# Patient Record
Sex: FEMALE | Race: BLACK OR AFRICAN AMERICAN | Employment: UNEMPLOYED | URBAN - METROPOLITAN AREA
[De-identification: names, ages, dates, MRNs, and addresses within clinical notes are randomized per-mention and may not be internally consistent; named-entity substitution may affect disease eponyms.]

---

## 2017-01-31 ENCOUNTER — OFFICE VISIT (OUTPATIENT)
Dept: ORTHOPEDIC SURGERY | Age: 62
End: 2017-01-31

## 2017-01-31 VITALS
DIASTOLIC BLOOD PRESSURE: 76 MMHG | WEIGHT: 185 LBS | RESPIRATION RATE: 18 BRPM | OXYGEN SATURATION: 97 % | BODY MASS INDEX: 29.73 KG/M2 | SYSTOLIC BLOOD PRESSURE: 146 MMHG | TEMPERATURE: 98 F | HEART RATE: 74 BPM | HEIGHT: 66 IN

## 2017-01-31 DIAGNOSIS — M48.061 LUMBAR SPINAL STENOSIS: Primary | ICD-10-CM

## 2017-01-31 DIAGNOSIS — M25.551 RIGHT HIP PAIN: ICD-10-CM

## 2017-01-31 DIAGNOSIS — M43.10 DEGENERATIVE SPONDYLOLISTHESIS: ICD-10-CM

## 2017-01-31 DIAGNOSIS — M79.2 NEUROPATHIC PAIN: ICD-10-CM

## 2017-01-31 RX ORDER — ALBUTEROL SULFATE 90 UG/1
2 AEROSOL, METERED RESPIRATORY (INHALATION)
COMMUNITY
Start: 2017-01-29 | End: 2017-09-25

## 2017-01-31 RX ORDER — AMLODIPINE BESYLATE 10 MG/1
10 TABLET ORAL DAILY
Refills: 0 | COMMUNITY
Start: 2016-12-28

## 2017-01-31 RX ORDER — NAPROXEN 500 MG/1
500 TABLET ORAL
COMMUNITY
Start: 2016-05-02 | End: 2017-09-25

## 2017-01-31 RX ORDER — OXYCODONE AND ACETAMINOPHEN 5; 325 MG/1; MG/1
TABLET ORAL
COMMUNITY
Start: 2016-07-10 | End: 2017-09-25

## 2017-01-31 RX ORDER — DICLOFENAC SODIUM 75 MG/1
TABLET, DELAYED RELEASE ORAL
Refills: 0 | COMMUNITY
Start: 2016-10-25 | End: 2017-09-25

## 2017-01-31 RX ORDER — FERROUS SULFATE, DRIED 160(50) MG
TABLET, EXTENDED RELEASE ORAL
COMMUNITY
Start: 2015-04-14 | End: 2017-09-25

## 2017-01-31 RX ORDER — DOXYCYCLINE 100 MG/1
100 CAPSULE ORAL
COMMUNITY
End: 2017-09-25

## 2017-01-31 RX ORDER — GABAPENTIN 100 MG/1
100 CAPSULE ORAL
COMMUNITY
Start: 2016-08-02 | End: 2017-09-25

## 2017-01-31 RX ORDER — BENZONATATE 100 MG/1
CAPSULE ORAL
COMMUNITY
Start: 2016-01-27 | End: 2017-09-25

## 2017-01-31 RX ORDER — HYDROCHLOROTHIAZIDE 12.5 MG/1
25 CAPSULE ORAL
COMMUNITY
Start: 2015-10-01

## 2017-01-31 RX ORDER — CETIRIZINE HCL 10 MG
10 TABLET ORAL
COMMUNITY
Start: 2017-01-29 | End: 2017-02-11

## 2017-01-31 RX ORDER — DIAZEPAM 5 MG/1
5 TABLET ORAL
COMMUNITY
Start: 2016-03-26 | End: 2017-09-25

## 2017-01-31 RX ORDER — LISINOPRIL 20 MG/1
20 TABLET ORAL
COMMUNITY
Start: 2015-10-01 | End: 2017-09-25

## 2017-01-31 RX ORDER — ALBUTEROL SULFATE 90 UG/1
2 AEROSOL, METERED RESPIRATORY (INHALATION)
COMMUNITY
Start: 2016-07-10 | End: 2017-09-25

## 2017-01-31 RX ORDER — NEOMYCIN SULFATE, POLYMYXIN B SULFATE AND HYDROCORTISONE 10; 3.5; 1 MG/ML; MG/ML; [USP'U]/ML
SUSPENSION/ DROPS AURICULAR (OTIC)
COMMUNITY
Start: 2017-01-29 | End: 2017-09-25

## 2017-01-31 RX ORDER — POLYETHYLENE GLYCOL 3350 17 G/17G
17 POWDER, FOR SOLUTION ORAL
COMMUNITY
Start: 2016-07-10 | End: 2017-09-25

## 2017-01-31 NOTE — PATIENT INSTRUCTIONS
Lumbar Stenosis: Care Instructions  Your Care Instructions    Stenosis in the spine is a narrowing of the canal that is around the spinal cord and nerve roots in your back. It can happen as part of aging. Sometimes bone and other tissue grow into this canal and press on the spinal nerves. This can cause pain, numbness, and weakness. When it happens in the lower part of your back, it is called lumbar stenosis. Lumbar stenosis can cause problems in the legs, feet, and rear end (buttocks). You may be able to relieve symptoms of lumbar stenosis with pain medicine. Your doctor may suggest physical therapy and exercises to keep your spine strong and flexible. Some people try cortisone shots to reduce swelling. If pain and numbness in your legs are still so bad that you cannot do your normal activities, you may need surgery. Follow-up care is a key part of your treatment and safety. Be sure to make and go to all appointments, and call your doctor if you are having problems. It's also a good idea to know your test results and keep a list of the medicines you take. How can you care for yourself at home? · Take an over-the-counter pain medicine, such as acetaminophen (Tylenol), ibuprofen (Advil, Motrin), or naproxen (Aleve). Read and follow all instructions on the label. · Do not take two or more pain medicines at the same time unless the doctor told you to. Many pain medicines have acetaminophen, which is Tylenol. Too much acetaminophen (Tylenol) can be harmful. · Stay at a healthy weight. Being overweight puts extra strain on your spine. · Change positions often when you sit or stand. This can ease pain. For example, lean forward. This may reduce pressure on the spinal cord and its nerves. · Avoid doing things that make your symptoms worse. Walking downhill and standing for a long time may cause pain. · Stretch and strengthen your back muscles as your doctor or physical therapist recommends.  If your doctor says it is okay to do them, these exercises may help. ¨ Lie on your back with your knees bent. Gently pull one bent knee to your chest. Put that foot back on the floor, and then pull the other knee to your chest.  ¨ Do pelvic tilts. Lie on your back with your knees bent. Tighten your stomach muscles. Pull your belly button (navel) in and up toward your ribs. You should feel like your back is pressing to the floor and your hips and pelvis are slightly lifting off the floor. Hold for 6 seconds while breathing smoothly. ¨ Stand with your back flat against a wall. Slowly slide down until your knees are slightly bent. Hold for 10 seconds, then slide back up the wall. · Remove or change anything in your house that may cause you to fall. Keep walkways clear of clutter, electrical cords, and throw rugs. When should you call for help? Call 911 anytime you think you may need emergency care. For example, call if:  · You are unable to move a leg at all. Call your doctor now or seek immediate medical care if:  · You have new or worse symptoms in your legs, belly, or buttocks. Symptoms may include:  ¨ Numbness or tingling. ¨ Weakness. ¨ Pain. · You lose bladder or bowel control. Watch closely for changes in your health, and be sure to contact your doctor if:  · You are not getting better as expected. Where can you learn more? Go to http://mouna-noris.info/. Lobo Francis in the search box to learn more about \"Lumbar Stenosis: Care Instructions. \"  Current as of: May 23, 2016  Content Version: 11.1  © 6647-7329 [employer]. Care instructions adapted under license by [employer] (which disclaims liability or warranty for this information). If you have questions about a medical condition or this instruction, always ask your healthcare professional. [address] any warranty or liability for your use of this information.

## 2017-01-31 NOTE — MR AVS SNAPSHOT
Visit Information Date & Time Provider Department Dept. Phone Encounter #  
 1/31/2017  1:00 PM Gurwinder Pfeiffer  [facility], [address] [facility] [phone] [id number] Follow-up Instructions Follow-up and Disposition History Your Appointments 3/1/2017  1:00 PM  
EMG with Agustina Benjamin MD  
[facility] [address] [facility]) Appt Note: Dr. Ed Kennedy; right lower extremity; order in cc; new pt pkt mld. ...ywp  
 [address] [address] [phone]  
  
   
 [facility] [address] Upcoming Health Maintenance Date Due Hepatitis C Screening 1955 BREAST CANCER SCRN MAMMOGRAM 5/6/2006 FOBT Q 1 YEAR AGE 50-75 5/6/2006 PAP AKA CERVICAL CYTOLOGY 2/14/2015 ZOSTER VACCINE AGE 60> 5/6/2016 INFLUENZA AGE 9 TO ADULT 8/1/2016 DTaP/Tdap/Td series (2 - Td) 10/31/2022 Allergies as of 1/31/2017  Review Complete On: 1/31/2017 By: Gurwinder Pfeiffer MD  
 No Known Allergies Current Immunizations  Never Reviewed Name Date TDAP Vaccine 10/31/2012 10:36 AM  
  
 Not reviewed this visit You Were Diagnosed With   
  
 Codes Comments Lumbar spinal stenosis    -  Primary ICD-10-CM: M48.06 
ICD-9-CM: 724.02 Right hip pain     ICD-10-CM: M25.551 ICD-9-CM: 719.45 Neuropathic pain     ICD-10-CM: M79.2 ICD-9-CM: 729.2 Degenerative spondylolisthesis     ICD-10-CM: M43.10 ICD-9-CM: 738.4 Vitals BP Pulse Temp Resp Height(growth percentile) Weight(growth percentile) 146/76 74 98 °F (36.7 °C) (Oral) 18 5' 6\" (1.676 m) 185 lb (83.9 kg) SpO2 BMI OB Status Smoking Status 97% 29.86 kg/m2 Postmenopausal Never Smoker BMI and BSA Data Body Mass Index Body Surface Area  
 29.86 kg/m 2 1.98 m 2 Preferred Pharmacy Pharmacy Name Phone [address] [address] [phone] Your Updated Medication List  
  
   
This list is accurate as of: 1/31/17  2:10 PM.  Always use your most recent med list.  
  
  
  
  
 * albuterol 90 mcg/actuation inhaler Commonly known as:  PROVENTIL HFA, VENTOLIN HFA, PROAIR HFA  
2 Puffs. * PROAIR HFA 90 mcg/actuation inhaler Generic drug:  albuterol 2 Puffs. amitriptyline 25 mg tablet Commonly known as:  ELAVIL  
take 1 tablet by mouth at bedtime * amLODIPine 5 mg tablet Commonly known as:  Tad Agatha 5 mg. * amLODIPine 10 mg tablet Commonly known as:  NORVASC  
  
 calcium-vitamin D 500 mg(1,250mg) -200 unit per tablet Commonly known as:  OYSTER SHELL Take 1 Tab by Mouth 3 Times Daily. Please take calcium per the following instructions: dispense 84 tabs2 tablets (1g) three times daily for 1 week, 2 tablets (1g) twice daily for 1 week,2 tablets (1g) once daily for 1 week. cyclobenzaprine 10 mg tablet Commonly known as:  FLEXERIL Take 1 Tab by mouth three (3) times daily as needed for Muscle Spasm(s). diclofenac EC 75 mg EC tablet Commonly known as:  VOLTAREN  
take 1 tablet once daily if needed  
  
 doxycycline 100 mg capsule Commonly known as:  MONODOX  
100 mg.  
  
 hydroCHLOROthiazide 12.5 mg capsule Commonly known as:  MICROZIDE  
25 mg.  
  
 ibuprofen 600 mg tablet Commonly known as:  MOTRIN Take 1 Tab by mouth every six (6) hours as needed for Pain.  
  
 levothyroxine 100 mcg tablet Commonly known as:  SYNTHROID Take 100 mcg by mouth Daily (before breakfast). lisinopril 20 mg tablet Commonly known as:  PRINIVIL, ZESTRIL  
20 mg. NAPROSYN 500 mg tablet Generic drug:  naproxen 500 mg.  
  
 neomycin-polymyxin-hydrocortisone (buffered) 3.5-10,000-1 mg/mL-unit/mL-% otic suspension Commonly known as:  Lopeno Low Instill 4 Drops in both ears 4 Times Daily. NEURONTIN 100 mg capsule Generic drug:  gabapentin 100 mg. NORVIR 100 mg tablet Generic drug:  ritonavir Norvir 100 MG Oral Tablet TAKE 1 TABLET DAILY  Quantity: 30;  Refills: 5    Helane Eis P.A.;  Started 25-Aug-2010 Active PERCOCET 5-325 mg per tablet Generic drug:  oxyCODONE-acetaminophen Take 1 Tab by Mouth Every 6 Hours As Needed. Do not exceed 4000 mg of acetaminophen per day. polyethylene glycol 17 gram/dose powder Commonly known as:  MIRALAX  
17 g. REYATAZ 300 mg capsule Generic drug:  atazanavir Take 300 mg by mouth daily. TESSALON PERLES 100 mg capsule Generic drug:  benzonatate Take 1 capsule 3 times daily as needed for coughing, swallow capsules whole. TRUVADA 200-300 mg per tablet Generic drug:  emtricitabine-tenofovir (TDF) Take 1 Tab by mouth daily. VALIUM 5 mg tablet Generic drug:  diazePAM  
5 mg. Carrol Yoon Rx for outpatient rollator for lumbar radiculopathy with leg weakness. High fall risk. ZyrTEC 10 mg tablet Generic drug:  cetirizine 10 mg.  
  
 * Notice: This list has 4 medication(s) that are the same as other medications prescribed for you. Read the directions carefully, and ask your doctor or other care provider to review them with you. To-Do List   
 01/31/2017 Neurology:  EMG ONE EXTREMITY LOWER RT   
  
 01/31/2017 Imaging:  MRI HIP  RT  WO CONT Patient Instructions Lumbar Stenosis: Care Instructions Your Care Instructions Stenosis in the spine is a narrowing of the canal that is around the spinal cord and nerve roots in your back. It can happen as part of aging. Sometimes bone and other tissue grow into this canal and press on the spinal nerves. This can cause pain, numbness, and weakness. When it happens in the lower part of your back, it is called lumbar stenosis. Lumbar stenosis can cause problems in the legs, feet, and rear end (buttocks). You may be able to relieve symptoms of lumbar stenosis with pain medicine. Your doctor may suggest physical therapy and exercises to keep your spine strong and flexible. Some people try cortisone shots to reduce swelling. If pain and numbness in your legs are still so bad that you cannot do your normal activities, you may need surgery. Follow-up care is a key part of your treatment and safety. Be sure to make and go to all appointments, and call your doctor if you are having problems. It's also a good idea to know your test results and keep a list of the medicines you take. How can you care for yourself at home? · Take an over-the-counter pain medicine, such as acetaminophen (Tylenol), ibuprofen (Advil, Motrin), or naproxen (Aleve). Read and follow all instructions on the label. · Do not take two or more pain medicines at the same time unless the doctor told you to. Many pain medicines have acetaminophen, which is Tylenol. Too much acetaminophen (Tylenol) can be harmful. · Stay at a healthy weight. Being overweight puts extra strain on your spine. · Change positions often when you sit or stand. This can ease pain. For example, lean forward. This may reduce pressure on the spinal cord and its nerves. · Avoid doing things that make your symptoms worse. Walking downhill and standing for a long time may cause pain. · Stretch and strengthen your back muscles as your doctor or physical therapist recommends. If your doctor says it is okay to do them, these exercises may help. ¨ Lie on your back with your knees bent. Gently pull one bent knee to your chest. Put that foot back on the floor, and then pull the other knee to your chest. 
¨ Do pelvic tilts. Lie on your back with your knees bent. Tighten your stomach muscles. Pull your belly button (navel) in and up toward your ribs. You should feel like your back is pressing to the floor and your hips and pelvis are slightly lifting off the floor. Hold for 6 seconds while breathing smoothly. ¨ Stand with your back flat against a wall. Slowly slide down until your knees are slightly bent. Hold for 10 seconds, then slide back up the wall. · Remove or change anything in your house that may cause you to fall. Keep walkways clear of clutter, electrical cords, and throw rugs. When should you call for help? Call 911 anytime you think you may need emergency care. For example, call if: 
· You are unable to move a leg at all. Call your doctor now or seek immediate medical care if: 
· You have new or worse symptoms in your legs, belly, or buttocks. Symptoms may include: ¨ Numbness or tingling. ¨ Weakness. ¨ Pain. · You lose bladder or bowel control. Watch closely for changes in your health, and be sure to contact your doctor if: 
· You are not getting better as expected. Where can you learn more? Go to http://mouna-noris.info/. Adan Winters in the search box to learn more about \"Lumbar Stenosis: Care Instructions. \" Current as of: May 23, 2016 Content Version: 11.1 © 7587-1714 [employer]. Care instructions adapted under license by [employer] (which disclaims liability or warranty for this information). If you have questions about a medical condition or this instruction, always ask your healthcare professional. [place] [address] any warranty or liability for your use of this information. Patient Instructions History Introducing [address]! Romayne Duster introduces startuply patient portal. Now you can access parts of your medical record, email your doctor's office, and request medication refills online. 1. In your internet browser, go to [email or web address] [employer] 2. Click on the First Time User? Click Here link in the Sign In box. You will see the New Member Sign Up page. 3. Enter your startuply Access Code exactly as it appears below.  You will not need to use this code after youve completed the sign-up process. If you do not sign up before the expiration date, you must request a new code. · [email or web address] Access Code: [email or web address] Expires: 3/27/2017 11:34 AM 
 
4. Enter the last four digits of your Social Security Number (xxxx) and Date of Birth (mm/dd/yyyy) as indicated and click Submit. You will be taken to the next sign-up page. 5. Create a REES46 ID. This will be your [email or web address] login ID and cannot be changed, so think of one that is secure and easy to remember. 6. Create a REES46 password. You can change your password at any time. 7. Enter your Password Reset Question and Answer. This can be used at a later time if you forget your password. 8. Enter your e-mail address. You will receive e-mail notification when new information is available in [address]. 9. Click Sign Up. You can now view and download portions of your medical record. 10. Click the Download Summary menu link to download a portable copy of your medical information. If you have questions, please visit the Frequently Asked Questions section of the REES46 website. Remember, REES46 is NOT to be used for urgent needs. For medical emergencies, dial 911. Now available from your iPhone and Android! Please provide this summary of care documentation to your next provider. Your primary care clinician is listed as Ann Marie Kumari. If you have any questions after today's visit, please call [phone].

## 2017-01-31 NOTE — PROGRESS NOTES
[address]  [address]  [address]  Phone: [phone]  Fax: [phone]  INITIAL CONSULTATION  Patient: Alo Montes De Oca                MRN: [id number]       SSN: [id number]  YOB: 1955        AGE: 62 y.o. SEX: female  Body mass index is 29.86 kg/(m^2). PCP: Michelle Barbosa MD  01/31/17    Chief Complaint   Patient presents with    Back Pain     follow up         HISTORY OF PRESENT ILLNESS, RADIOGRAPHS, and PLAN:         HISTORY OF PRESENT ILLNESS:  Ms. Leandra Sesay is seen today at the request of Dr. Iza Ventura and Dr. Saundra Limon. Ms. Leandra Sesay is a pleasant, 62-year-old female with a history of HIV, Hepatitis C, anxiety, hypertension, and thyroid disease. She has had a year of severe pain. It started out, and was thought initially by her initial doctors, to be hip arthritis and then sciatica. She was seen by a variety of physicians and referred to me with the thought of it being back pathology. The patient comes to me. She says she gets absolutely no back pain. She gets leg pain on the right radiating into her anterior thigh to her knee and occasionally to the foot. It is primarily groin pain to the knee. She cannot stand or walk. She cannot turn over in bed. She has to sit through doing all activity. She has some pain at rest.  She denies bowel or bladder dysfunction, fevers, chills, night sweats, weight loss, or weight gain. Her hepatitis and HIV are evidently well controlled on medications. She denies any other constitutional symptoms. She has had no relief from epidural steroids. She is suffering from depression. PHYSICAL EXAMINATION:  Her physical exam demonstrates an overweight female in severe distress. She has normal strength of her EHL, tib/ant, hamstrings, and quadriceps. She has painful range of motion of her right hip to the point of tears, especially with hip flexion.   She has negative straight leg raise, negative femoral stretch test.  No sensory deficit, no reflex change, and, again, no back pain. RADIOGRAPHS:  MRI of the lumbar spine demonstrates what appears to be degenerative spondylolisthesis at L4-5 with severe facet arthropathy and generalized stenosis at the L4-5 segment. CT scan of the abdomen and pelvis appears to demonstrate relatively normal pelvis and hips. There are no other studies to note. ASSESSMENT/PLAN: I agree this patient has degenerative spondylolisthesis and spinal stenosis. However, the patient has no back pain and has no classic low lumbar radiculopathy. The patient is having groin and primarily more anterior thigh pain that stops at the knee. She has no nerve tension signs and has had no relief from epidural steroids. Her symptom is not classically one of spinal stenosis or a lumbar radicular pattern. While one could address her lumbar spine issues, I do not believe that her symptoms are classically related to her lumbar spine. At this point, I am concerned she is having some sort of hip pathology. I will get an MRI of her hip and get an EMG of her lower extremity. I will see her back following her hip MRI. Even though she is likely having some sort of hip or psoas muscle pathology that is giving rise to groin and anterior thigh pain, she may have femur pathology not certain to me. This has been ongoing for nearly one year now. Certainly, she has significant lumbar pathology, but at this point, there are no clear clinical symptoms that one would relate to it. Again, the patient is having absolutely no back pain and is having unilateral, anterior thigh and groin pain that is made worse by hip range of motion. We will see her back following her studies. cc: Modesto Pastrana M.D.              Alberto Valles of lumbar spine upon return.     Past Medical History   Diagnosis Date    [facility]     Hypertension     Infectious disease      HIV, hepatitis C  Liver disease      Heatitis C    Psychiatric disorder      depression       Family History   Problem Relation Age of Onset    Family history unknown: Yes       Current Outpatient Prescriptions   Medication Sig Dispense Refill    albuterol (PROAIR HFA) 90 mcg/actuation inhaler 2 Puffs.  calcium-vitamin D (OYSTER SHELL) 500 mg(1,250mg) -200 unit per tablet Take 1 Tab by Mouth 3 Times Daily. Please take calcium per the following instructions: dispense 84 tabs2 tablets (1g) three times daily for 1 week, 2 tablets (1g) twice daily for 1 week,2 tablets (1g) once daily for 1 week.  cetirizine (ZYRTEC) 10 mg tablet 10 mg.      doxycycline (MONODOX) 100 mg capsule 100 mg.      gabapentin (NEURONTIN) 100 mg capsule 100 mg.  hydroCHLOROthiazide (MICROZIDE) 12.5 mg capsule 25 mg.      lisinopril (PRINIVIL, ZESTRIL) 20 mg tablet 20 mg.      naproxen (NAPROSYN) 500 mg tablet 500 mg.      amLODIPine (NORVASC) 10 mg tablet   0    ibuprofen (MOTRIN) 600 mg tablet Take 1 Tab by mouth every six (6) hours as needed for Pain. 180 Tab 2    cyclobenzaprine (FLEXERIL) 10 mg tablet Take 1 Tab by mouth three (3) times daily as needed for Muscle Spasm(s). 90 Tab 2    amitriptyline (ELAVIL) 25 mg tablet take 1 tablet by mouth at bedtime  0    Walker misc Rx for outpatient rollator for lumbar radiculopathy with leg weakness. High fall risk. 1 Each 0    atazanavir (REYATAZ) 300 mg capsule Take 300 mg by mouth daily.  emtricitabine-tenofovir (TRUVADA) 200-300 mg per tablet Take 1 Tab by mouth daily.  levothyroxine (SYNTHROID) 100 mcg tablet Take 100 mcg by mouth Daily (before breakfast).  albuterol (PROVENTIL HFA, VENTOLIN HFA, PROAIR HFA) 90 mcg/actuation inhaler 2 Puffs.  benzonatate (TESSALON PERLES) 100 mg capsule Take 1 capsule 3 times daily as needed for coughing, swallow capsules whole.  diazePAM (VALIUM) 5 mg tablet 5 mg.       neomycin-polymyxin-hydrocortisone, buffered, (PEDIOTIC) 3.5-10,000-1 mg/mL-unit/mL-% otic suspension Instill 4 Drops in both ears 4 Times Daily.  oxyCODONE-acetaminophen (PERCOCET) 5-325 mg per tablet Take 1 Tab by Mouth Every 6 Hours As Needed. Do not exceed 4000 mg of acetaminophen per day.  polyethylene glycol (MIRALAX) 17 gram/dose powder 17 g.      diclofenac EC (VOLTAREN) 75 mg EC tablet take 1 tablet once daily if needed  0    amLODIPine (NORVASC) 5 mg tablet 5 mg.  ritonavir (NORVIR) 100 mg tablet Norvir 100 MG Oral Tablet  TAKE 1 TABLET DAILY   Quantity: 30;  Refills: 5       CAILIN TOLBERT;  Started 25-Aug-2010  Active         No Known Allergies    Past Surgical History   Procedure Laterality Date    Hx hysterectomy         Past Medical History   Diagnosis Date    [facility] disease ([facility]     Hypertension     Infectious disease      HIV, hepatitis C    Liver disease      Heatitis C    Psychiatric disorder      depression       Social History     Social History    Marital status:      Spouse name: N/A    Number of children: N/A    Years of education: N/A     Occupational History    Not on file. Social History Main Topics    Smoking status: Never Smoker    Smokeless tobacco: Never Used    Alcohol use Yes      Comment: occasionally    Drug use: Yes     Special: Cocaine      Comment: hx of cocaine use    Sexual activity: Not on file     Other Topics Concern    Not on file     Social History Narrative       REVIEW OF SYSTEMS:   CONSTITUTIONAL SYMPTOMS:  Negative. EYES:  Negative. EARS, NOSE, THROAT AND MOUTH:  Negative. CARDIOVASCULAR:  Negative. RESPIRATORY:  Negative. GENITOURINARY: Negative. GASTROINTESTINAL:  Negative. INTEGUMENTARY (SKIN AND/OR BREAST):  Negative. MUSCULOSKELETAL: Per HPI.   ENDOCRINE/RHEUMATOLOGIC:  Negative. NEUROLOGICAL:  Per HPI. HEMATOLOGIC/LYMPHATIC:  Negative. ALLERGIC/IMMUNOLOGIC:  Negative. PSYCHIATRIC:  Negative.     PHYSICAL EXAMINATION:   Visit Vitals    /76  Pulse 74    Temp 98 °F (36.7 °C) (Oral)    Resp 18    Ht 5' 6\" (1.676 m)    Wt 185 lb (83.9 kg)    SpO2 97%    BMI 29.86 kg/m2    PAIN SCALE: 10 - Worst pain ever/10    CONSTITUTIONAL: The patient is in no apparent distress and is alert and oriented x 3. HEENT: Normocephalic. Hearing loss. SKIN:  Negative for scars, rashes, lesions, or ulcers on the right upper, right lower, left upper, left lower and trunk. NEUROLOGICAL: Alert and oriented x 3. Pt presents in a wheelchair. EXTREMITIES:  See musculoskeletal.  MUSCULOSKELETAL:   Head and Neck:  Negative for misalignment, asymmetry, crepitation, defects, tenderness masses or effusions.  Left Upper Extremity: Inspection, percussion and palpation preformed. Santillans sign is negative.  Right Upper Extremity: Inspection, percussion and palpation preformed. Santillans sign is negative.  Spine, Ribs and Pelvis: No back pain. Pain starts in RT buttock. Inspection, percussion and palpation preformed. Negative for misalignment, asymmetry, crepitation, defects, tenderness masses or effusions.  Right Lower Extremity: Pain that radiates from the buttock and into anterior thigh and knee. Unable to stand or walk for very long. Is able to ride bicycle. Difficulty with rising from seated position or from bed. Standing exacerbates pain. Pain with hip ROM. Groin pain. Inspection, percussion and palpation preformed.  Left Lower Extremity: Inspection, percussion and palpation preformed. SPINE EXAM:     Lumbar spine: No rash, ecchymosis, or gross obliquity. No focal atrophy is noted. ASSESSMENT    ICD-10-CM ICD-9-CM    1. Lumbar spinal stenosis M48.06 724.02 MRI HIP  RT  WO CONT      AMB POC XRAY, SPINE, LUMBOSACRAL; 4+      EMG ONE EXTREMITY LOWER RT   2. Right hip pain M25.551 719.45 MRI HIP  RT  WO CONT      AMB POC XRAY, SPINE, LUMBOSACRAL; 4+      EMG ONE EXTREMITY LOWER RT   3.  Neuropathic pain M79.2 729.2 EMG ONE EXTREMITY LOWER RT 4.  Degenerative spondylolisthesis M43.10 738.4        Written by Porter Dang, as dictated by Ely Evans MD.

## 2017-02-07 ENCOUNTER — TELEPHONE (OUTPATIENT)
Dept: ORTHOPEDIC SURGERY | Age: 62
End: 2017-02-07

## 2017-02-07 NOTE — TELEPHONE ENCOUNTER
Called patient to inform her of the message below. Patient stated she did not want to wait any longer this has dragged out enough. Informed patient that at the time of visit Dr. Angel White felt that patient needed an EMG and an MRI to determine what the next step is. Informed patient that if Dr. Angel White feels an xray is needed he will order it in the office at her next appointment. Patient wanted sooner appointment. Informed patient that both studies need to be completed prior to seeing him so he can determine the plan of care. Patient stated she didn't know that and now she understands. Patient informed she will need to get MRI put on a disk at MRI CT diagnostic. Patient also informed she was added on for March 10 th 09:40 am.  Appointment ok by Dr. Angel White for diagnostic follow up.

## 2017-02-07 NOTE — TELEPHONE ENCOUNTER
Please review the message below and advise. She has appointment to see Dr. Luz Marina Robles on 03/01/17. No follow up appointment was made when she checked out.

## 2017-02-07 NOTE — TELEPHONE ENCOUNTER
She needs to schedule her f/u appt with Dr Kalani Ferguson after her EMG is done and x-rays can be done in the office here when she sees Dr Kalani Ferguson if he feels they are necessary

## 2017-02-07 NOTE — TELEPHONE ENCOUNTER
Ms. Jesus Tran is calling regarding a request for x-rays. She states no x-rays have been performed and she wants them completed before her next visit to Dr. Davey Giordano. She is tired of waiting. There is no x-ray request in [facility]. She is scheduled for her EMG on 03/01/17 and has been provided with Dr. Pippa montes de oca so she may get an earlier appointment. The MRI of the RT Hip has been completed. Of note, there is no follow-up appointment scheduled with Dr. Davey Giordano. Please call the patient to advise. Thank you.

## 2017-02-16 DIAGNOSIS — M48.061 LUMBAR SPINAL STENOSIS: ICD-10-CM

## 2017-02-16 DIAGNOSIS — M25.551 RIGHT HIP PAIN: ICD-10-CM

## 2017-03-01 ENCOUNTER — OFFICE VISIT (OUTPATIENT)
Dept: NEUROLOGY | Age: 62
End: 2017-03-01

## 2017-03-01 DIAGNOSIS — M43.10 DEGENERATIVE SPONDYLOLISTHESIS: ICD-10-CM

## 2017-03-01 DIAGNOSIS — M48.061 LUMBAR SPINAL STENOSIS: Primary | ICD-10-CM

## 2017-03-01 NOTE — LETTER
3/1/2017 3:01 PM 
 
Patient:  Lavell Allen YOB: 1955 Date of Visit: 3/1/2017 Dear Verónica Arguello MD 
[address] [place] [address] [address] 10 [facility] [phone] [id number] VIA Facsimile: [phone] Michelle Sim MD 
[address] [facility] [address] VIA In Basket 
 : Thank you for referring Ms. Lavell Allen to me for evaluation/treatment. Below are the relevant portions of my assessment and plan of care. [facility] [address], [address] 
[phone]      [facility] 117 Neurophysiology Report Patient: Iván Brambila    
ID: [id number] Physician: Ruby Wilkinson. Lorenza Litten MD  
Gender: Female Ref Phys: Artem Cotton MD  
Handedness:     
Study Date: March 1, 2017 Patient History: This 80-year-old patient's has had right leg pain for greater than 1 year. The pain radiates from the hip down into the knee area. On brief exam she has intact strength and sensation. Reflexes are trace and symmetrical. 
 
 
Nerve Conduction Studies Anti Sensory Summary Table Stim Site NR Peak (ms) Norm Peak (ms) O-P Amp (µV) Norm O-P Amp Dist (cm) Lazaro (m/s) Left Sural Anti Sensory (Ankle) Calf    8.4 <4.4 0.9 >6.0 14.0 16.9 Site 2    9.8  0.6 Site 3    8.2  1.2 Right Sural Anti Sensory (Ankle) Calf    3.8 <4.4 0.1 >6.0 14.0 42.4 Site 2    3.4  2.6 Site 3    3.1  2.3 Site 4    2.9  1.5 Site 5    2.9 Site 6    3.0  0.4 Motor Summary Table Stim Site NR Onset (ms) Norm Onset (ms) O-P Amp (mV) Norm O-P Amp Dist (cm) Lazaro (m/s) Norm Lazaro (m/s) Left Peroneal Motor (EDB) Ankle    3.4 <6.5 0.5 >2.0 33.0 47.1 >44 Fibula (Head)    10.4  0.5 Right Peroneal Motor (EDB) Ankle    3.7 <6.5 0.6 >2.0 32.0 44.4 >44 Fibula (Head)    10.9  0.3  10.0 34.5 Pop Fossa    13.8  0.3 Left Tibial Motor (Abd Penny) Ankle    3.9 <5.8 2.6 >4.0 37.0 45.1 >41 Knee    12.1  1.2 Right Tibial Motor (Abd Penny) Ankle    3.9 <5.8 2.4 >4.0 36.0 45.6 >41 Knee    11.8  2.1 EMG Side Muscle Nerve Root Ins Act Fibs Psw Fasc Amp Dur Poly Recrt Int Wilhelminia Page Comment Right AntTibialis Dp Br Fibular L4-5 Nml Nml Nml None Nml Nml 0 Nml Nml Right MedGastroc   Nml Nml Nml None Nml Nml 0 Nml Nml Right ExtHallLong Dp Br Fibular L5, S1 Nml Nml Nml None Nml Nml 0 Nml Nml Right VastusMed Femoral L2-4 Nml Nml Nml None Nml Nml 0 Nml Nml Right Lumbo Parasp Up Rami L1-2 Nml Nml Nml Right Lumbo Parasp Mid Rami L3-4 Nml Nml Nml Right Lumbo Parasp Low Rami L5-S1 Nml Nml Nml NCS/EMG FINDINGS: 
 
? Evaluation of the Left peroneal motor, the Right peroneal motor, the Left tibial motor, the Right tibial motor, and the Right sural sensory nerves showed reduced amplitude (L0.5, R0.6, L2.6, R2.4, R0.1 µV). ? The Left sural sensory nerve showed prolonged distal peak latency (8.4 ms), reduced amplitude (0.9 µV), and decreased conduction velocity (Calf-Ankle, 16.9 m/s). 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 No signs of radiculopathy were appreciated. 
 
 
___________________________ [place] Nirav Lara MD 
 
 
 
 
Waveforms: If you have questions, please do not hesitate to call me. I look forward to following MsTahmina Ruth Echols along with you.  
 
 
 
Sincerely, 
 
 
Angélica Haywood MD

## 2017-03-01 NOTE — COMMUNICATION BODY
[facility]  [address] [place] [address], [address]  [phone]      [facility]    Neurophysiology Report      Patient: Debora Morris     ID: [id number] Physician: Leann Bee. Raimundo Patel MD   Gender: Female Ref Phys: Tayler Jacinto MD   Handedness:      Study Date: March 1, 2017         Patient History: This 70-year-old patient's has had right leg pain for greater than 1 year. The pain radiates from the hip down into the knee area. On brief exam she has intact strength and sensation.   Reflexes are trace and symmetrical.      Nerve Conduction Studies  Anti Sensory Summary Table     Stim Site NR Peak (ms) Norm Peak (ms) O-P Amp (µV) Norm O-P Amp Dist (cm) Lazaro (m/s)   Left Sural Anti Sensory (Ankle)   Calf    8.4 <4.4 0.9 >6.0 14.0 16.9   Site 2    9.8  0.6      Site 3    8.2  1.2      Right Sural Anti Sensory (Ankle)   Calf    3.8 <4.4 0.1 >6.0 14.0 42.4   Site 2    3.4  2.6      Site 3    3.1  2.3      Site 4    2.9  1.5      Site 5    2.9        Site 6    3.0  0.4        Motor Summary Table     Stim Site NR Onset (ms) Norm Onset (ms) O-P Amp (mV) Norm O-P Amp Dist (cm) Lazaro (m/s) Norm Lazaro (m/s)   Left Peroneal Motor (EDB)   Ankle    3.4 <6.5 0.5 >2.0 33.0 47.1 >44   Fibula (Head)    10.4  0.5       Right Peroneal Motor (EDB)   Ankle    3.7 <6.5 0.6 >2.0 32.0 44.4 >44   Fibula (Head)    10.9  0.3  10.0 34.5    Pop Fossa    13.8  0.3       Left Tibial Motor (Abd Champagne Brev)   Ankle    3.9 <5.8 2.6 >4.0 37.0 45.1 >41   Knee    12.1  1.2       Right Tibial Motor (Abd Champagne Brev)   Ankle    3.9 <5.8 2.4 >4.0 36.0 45.6 >41   Knee    11.8  2.1           EMG     Side Muscle Nerve Root Ins Act Fibs Psw Fasc Amp Dur Poly Recrt Int Patel Baba Comment   Right AntTibialis Dp Br Fibular L4-5 Nml Nml Nml None Nml Nml 0 Nml Nml    Right MedGastroc   Nml Nml Nml None Nml Nml 0 Nml Nml    Right ExtHallLong Dp Br Fibular L5, S1 Nml Nml Nml None Nml Nml 0 Nml Nml    Right VastusMed Femoral L2-4 Nml Nml Nml None Nml Nml 0 Nml Nml    Right Lumbo Parasp Up Rami L1-2 Nml Nml Nml          Right Lumbo Parasp Mid Rami L3-4 Nml Nml Nml          Right Lumbo Parasp Low Rami L5-S1 Nml Nml Nml            NCS/EMG FINDINGS:     Evaluation of the Left peroneal motor, the Right peroneal motor, the Left tibial motor, the Right tibial motor, and the Right sural sensory nerves showed reduced amplitude (L0.5, R0.6, L2.6, R2.4, R0.1 µV).  The Left sural sensory nerve showed prolonged distal peak latency (8.4 ms), reduced amplitude (0.9 µV), and decreased conduction velocity (Calf-Ankle, 16.9 m/s). 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 No signs of radiculopathy were appreciated.      ___________________________  Calista Mon.  Yumiko Hidalgo MD          Waveforms:

## 2017-03-01 NOTE — PROGRESS NOTES
[facility]  [address] [place] [address] [address], [address]  [phone]      [facility]    Neurophysiology Report      Patient: Tabatha Graves     ID: [id number] Physician: Constantino Ca. Fay Morrison MD   Gender: Female Ref Phys: Guicho Mirza MD   Handedness:      Study Date: March 1, 2017         Patient History: This 79-year-old patient's has had right leg pain for greater than 1 year. The pain radiates from the hip down into the knee area. On brief exam she has intact strength and sensation.   Reflexes are trace and symmetrical.      Nerve Conduction Studies  Anti Sensory Summary Table     Stim Site NR Peak (ms) Norm Peak (ms) O-P Amp (µV) Norm O-P Amp Dist (cm) Lazaro (m/s)   Left Sural Anti Sensory (Ankle)   Calf    8.4 <4.4 0.9 >6.0 14.0 16.9   Site 2    9.8  0.6      Site 3    8.2  1.2      Right Sural Anti Sensory (Ankle)   Calf    3.8 <4.4 0.1 >6.0 14.0 42.4   Site 2    3.4  2.6      Site 3    3.1  2.3      Site 4    2.9  1.5      Site 5    2.9        Site 6    3.0  0.4        Motor Summary Table     Stim Site NR Onset (ms) Norm Onset (ms) O-P Amp (mV) Norm O-P Amp Dist (cm) Lazaro (m/s) Norm Lazaro (m/s)   Left Peroneal Motor (EDB)   Ankle    3.4 <6.5 0.5 >2.0 33.0 47.1 >44   Fibula (Head)    10.4  0.5       Right Peroneal Motor (EDB)   Ankle    3.7 <6.5 0.6 >2.0 32.0 44.4 >44   Fibula (Head)    10.9  0.3  10.0 34.5    Pop Fossa    13.8  0.3       Left Tibial Motor (Abd Champagne Brev)   Ankle    3.9 <5.8 2.6 >4.0 37.0 45.1 >41   Knee    12.1  1.2       Right Tibial Motor (Abd Champagne Brev)   Ankle    3.9 <5.8 2.4 >4.0 36.0 45.6 >41   Knee    11.8  2.1           EMG     Side Muscle Nerve Root Ins Act Fibs Psw Fasc Amp Dur Poly Recrt Int Macie Medico Comment   Right AntTibialis Dp Br Fibular L4-5 Nml Nml Nml None Nml Nml 0 Nml Nml    Right MedGastroc   Nml Nml Nml None Nml Nml 0 Nml Nml    Right ExtHallLong Dp Br Fibular L5, S1 Nml Nml Nml None Nml Nml 0 Nml Nml    Right VastusMed Femoral L2-4 Nml Nml Nml None Nml Nml 0 Nml Nml    Right Lumbo Parasp Up Rami L1-2 Nml Nml Nml          Right Lumbo Parasp Mid Rami L3-4 Nml Nml Nml          Right Lumbo Parasp Low Rami L5-S1 Nml Nml Nml            NCS/EMG FINDINGS:     Evaluation of the Left peroneal motor, the Right peroneal motor, the Left tibial motor, the Right tibial motor, and the Right sural sensory nerves showed reduced amplitude (L0.5, R0.6, L2.6, R2.4, R0.1 µV).  The Left sural sensory nerve showed prolonged distal peak latency (8.4 ms), reduced amplitude (0.9 µV), and decreased conduction velocity (Calf-Ankle, 16.9 m/s). 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 No signs of radiculopathy were appreciated.      ___________________________  Calista Hidalgo MD          Waveforms:                [facility] [address]  OFFICE PROCEDURE PROGRESS NOTE        Chart reviewed for the following:   Moira Tim MD, have reviewed the History, Physical and updated the Allergic reactions for [address] performed immediately prior to start of procedure:   Moira Tim MD, have performed the following reviews on Art Lindsay prior to the start of the procedure:            * Patient was identified by name and date of birth   * Agreement on procedure being performed was verified  * Risks and Benefits explained to the patient  * Procedure site verified and marked as necessary  * Patient was positioned for comfort  * Consent was signed and verified     Time: 3:01 PM    Date of procedure: 3/1/2017    Procedure performed by:  Lu Velasquez MD    Provider assisted by: Lucina Montanez     Patient assisted by: self    How tolerated by patient: tolerated the procedure well with no complications    Post Procedural Pain Scale: 0 - No Hurt    Comments: none

## 2017-03-10 ENCOUNTER — OFFICE VISIT (OUTPATIENT)
Dept: ORTHOPEDIC SURGERY | Age: 62
End: 2017-03-10

## 2017-03-10 VITALS
SYSTOLIC BLOOD PRESSURE: 173 MMHG | TEMPERATURE: 98.1 F | RESPIRATION RATE: 18 BRPM | OXYGEN SATURATION: 98 % | HEART RATE: 72 BPM | BODY MASS INDEX: 29.73 KG/M2 | WEIGHT: 185 LBS | DIASTOLIC BLOOD PRESSURE: 98 MMHG | HEIGHT: 66 IN

## 2017-03-10 DIAGNOSIS — M48.061 LUMBAR SPINAL STENOSIS: Primary | ICD-10-CM

## 2017-03-10 DIAGNOSIS — M25.551 RIGHT HIP PAIN: ICD-10-CM

## 2017-03-10 DIAGNOSIS — M79.2 NEUROPATHIC PAIN: ICD-10-CM

## 2017-03-10 RX ORDER — METHOCARBAMOL 500 MG/1
TABLET, FILM COATED ORAL
COMMUNITY
Start: 2016-05-06 | End: 2017-09-25

## 2017-03-10 RX ORDER — LISINOPRIL 10 MG/1
TABLET ORAL
Refills: 0 | COMMUNITY
Start: 2017-02-14

## 2017-03-10 RX ORDER — EMTRICITABINE AND TENOFOVIR ALAFENAMIDE 200; 25 MG/1; MG/1
TABLET ORAL
Refills: 0 | COMMUNITY
Start: 2017-02-23

## 2017-03-10 RX ORDER — AMOXICILLIN 875 MG/1
TABLET, FILM COATED ORAL
Refills: 0 | COMMUNITY
Start: 2017-02-22 | End: 2017-09-25

## 2017-03-10 RX ORDER — CIPROFLOXACIN 250 MG/1
TABLET, FILM COATED ORAL
Refills: 0 | COMMUNITY
Start: 2017-02-14 | End: 2017-09-25

## 2017-03-10 RX ORDER — DOCUSATE SODIUM 100 MG/1
CAPSULE, LIQUID FILLED ORAL
COMMUNITY
Start: 2016-05-02 | End: 2017-09-25

## 2017-03-10 RX ORDER — HYDROCODONE BITARTRATE AND ACETAMINOPHEN 5; 325 MG/1; MG/1
1 TABLET ORAL
Qty: 50 TAB | Refills: 0 | Status: SHIPPED | OUTPATIENT
Start: 2017-03-10 | End: 2017-09-25

## 2017-03-10 NOTE — MR AVS SNAPSHOT
Visit Information Date & Time Provider Department Dept. Phone Encounter #  
 3/10/2017  9:40 AM Alberta Olvera MD [facility], [address] and [facility] [phone] [id number] Follow-up Instructions Follow-up and Disposition History Upcoming Health Maintenance Date Due Hepatitis C Screening 1955 BREAST CANCER SCRN MAMMOGRAM 5/6/2006 FOBT Q 1 YEAR AGE 50-75 5/6/2006 PAP AKA CERVICAL CYTOLOGY 2/14/2015 ZOSTER VACCINE AGE 60> 5/6/2016 INFLUENZA AGE 9 TO ADULT 8/1/2016 DTaP/Tdap/Td series (2 - Td) 10/31/2022 Allergies as of 3/10/2017  Review Complete On: 3/10/2017 By: Dyana Nelson No Known Allergies Current Immunizations  Never Reviewed Name Date TDAP Vaccine 10/31/2012 10:36 AM  
  
 Not reviewed this visit You Were Diagnosed With   
  
 Codes Comments Lumbar spinal stenosis    -  Primary ICD-10-CM: M48.06 
ICD-9-CM: 724.02 Neuropathic pain     ICD-10-CM: M79.2 ICD-9-CM: 729.2 Right hip pain     ICD-10-CM: M25.551 ICD-9-CM: 719.45 Vitals BP Pulse Temp Resp Height(growth percentile) Weight(growth percentile) (!) 173/98 72 98.1 °F (36.7 °C) (Oral) 18 5' 6\" (1.676 m) 185 lb (83.9 kg) SpO2 BMI OB Status Smoking Status 98% 29.86 kg/m2 Postmenopausal Never Smoker BMI and BSA Data Body Mass Index Body Surface Area  
 29.86 kg/m 2 1.98 m 2 Preferred Pharmacy Pharmacy Name Phone [address] [place] [address] [phone] Your Updated Medication List  
  
   
This list is accurate as of: 3/10/17 10:32 AM.  Always use your most recent med list.  
  
  
  
  
 * albuterol 90 mcg/actuation inhaler Commonly known as:  PROVENTIL HFA, VENTOLIN HFA, PROAIR HFA  
2 Puffs. * PROAIR HFA 90 mcg/actuation inhaler Generic drug:  albuterol 2 Puffs. amitriptyline 25 mg tablet Commonly known as:  ELAVIL  
 take 1 tablet by mouth at bedtime * amLODIPine 5 mg tablet Commonly known as:  Tierney Harry 5 mg. * amLODIPine 10 mg tablet Commonly known as:  NORVASC  
  
 amoxicillin 875 mg tablet Commonly known as:  AMOXIL  
take 1 tablet by mouth every 12 hours  
  
 calcium-vitamin D 500 mg(1,250mg) -200 unit per tablet Commonly known as:  OYSTER SHELL Take 1 Tab by Mouth 3 Times Daily. Please take calcium per the following instructions: dispense 84 tabs2 tablets (1g) three times daily for 1 week, 2 tablets (1g) twice daily for 1 week,2 tablets (1g) once daily for 1 week. ciprofloxacin HCl 250 mg tablet Commonly known as:  CIPRO  
take 1 tablet by mouth twice a day COL-RITE 100 mg capsule Generic drug:  docusate sodium RA Col-Rite 100 MG Oral Capsule take 1 capsule by mouth twice a day for 14 days  Quantity: 30;  Refills: 0   Started 2-May-2016 Active  
  
 cyclobenzaprine 10 mg tablet Commonly known as:  FLEXERIL Take 1 Tab by mouth three (3) times daily as needed for Muscle Spasm(s). DESCOVY tablet Generic drug:  emtricitabine-tenofovir alafen  
  
 diclofenac EC 75 mg EC tablet Commonly known as:  VOLTAREN  
take 1 tablet once daily if needed  
  
 doxycycline 100 mg capsule Commonly known as:  MONODOX  
100 mg.  
  
 hydroCHLOROthiazide 12.5 mg capsule Commonly known as:  MICROZIDE  
25 mg. HYDROcodone-acetaminophen 5-325 mg per tablet Commonly known as:  Janice Punt Take 1 Tab by mouth every eight (8) hours as needed for Pain. Max Daily Amount: 3 Tabs. ibuprofen 600 mg tablet Commonly known as:  MOTRIN Take 1 Tab by mouth every six (6) hours as needed for Pain.  
  
 levothyroxine 100 mcg tablet Commonly known as:  SYNTHROID Take 100 mcg by mouth Daily (before breakfast). * lisinopril 20 mg tablet Commonly known as:  PRINIVIL, ZESTRIL  
20 mg.  
  
 * lisinopril 10 mg tablet Commonly known as:  Aletha Franklin  
 take 1 tablet by mouth once daily  
  
 methocarbamol 500 mg tablet Commonly known as:  ROBAXIN Methocarbamol 500 MG Oral Tablet TAKE 1 TABLET Every 8 hours PRN low back, hip, leg pain  Quantity: 1;  Refills: 0    Jessica Nieto M.D.;  Started [id number] Tablet Bottle NAPROSYN 500 mg tablet Generic drug:  naproxen 500 mg.  
  
 neomycin-polymyxin-hydrocortisone (buffered) 3.5-10,000-1 mg/mL-unit/mL-% otic suspension Commonly known as:  Mary Arms Instill 4 Drops in both ears 4 Times Daily. NEURONTIN 100 mg capsule Generic drug:  gabapentin 100 mg. NORVIR 100 mg tablet Generic drug:  ritonavir Norvir 100 MG Oral Tablet TAKE 1 TABLET DAILY  Quantity: 30;  Refills: 5    Grayce Loosen P.A.;  Started 25-Aug-2010 Active PERCOCET 5-325 mg per tablet Generic drug:  oxyCODONE-acetaminophen Take 1 Tab by Mouth Every 6 Hours As Needed. Do not exceed 4000 mg of acetaminophen per day. polyethylene glycol 17 gram/dose powder Commonly known as:  MIRALAX  
17 g. REYATAZ 300 mg capsule Generic drug:  atazanavir Take 300 mg by mouth daily. TESSALON PERLES 100 mg capsule Generic drug:  benzonatate Take 1 capsule 3 times daily as needed for coughing, swallow capsules whole. TRUVADA 200-300 mg per tablet Generic drug:  emtricitabine-tenofovir (TDF) Take 1 Tab by mouth daily. VALIUM 5 mg tablet Generic drug:  diazePAM  
5 mg. [facility] Moreno Rx for outpatient rollator for lumbar radiculopathy with leg weakness. High fall risk. * Notice: This list has 6 medication(s) that are the same as other medications prescribed for you. Read the directions carefully, and ask your doctor or other care provider to review them with you. Prescriptions Printed Refills HYDROcodone-acetaminophen (NORCO) 5-325 mg per tablet 0 Sig: Take 1 Tab by mouth every eight (8) hours as needed for Pain.  Max Daily Amount: 3 Tabs. Class: Print Route: Oral  
  
To-Do List   
 03/10/2017 Imaging:  CT CHEST ABD PELV W WO CONT Introducing [facility]! Wesley Floyd introduces [employer] patient portal. Now you can access parts of your medical record, email your doctor's office, and request medication refills online. 1. In your internet browser, go to [email or web address] 2. Click on the First Time User? Click Here link in the Sign In box. You will see the New Member Sign Up page. 3. Enter your [employer] Access Code exactly as it appears below. You will not need to use this code after youve completed the sign-up process. If you do not sign up before the expiration date, you must request a new code. · [employer] Access Code: [email or web address] Expires: 3/27/2017 11:34 AM 
 
4. Enter the last four digits of your Social Security Number (xxxx) and Date of Birth (mm/dd/yyyy) as indicated and click Submit. You will be taken to the next sign-up page. 5. Create a SparCode ID. This will be your [employer] login ID and cannot be changed, so think of one that is secure and easy to remember. 6. Create a [employer] password. You can change your password at any time. 7. Enter your Password Reset Question and Answer. This can be used at a later time if you forget your password. 8. Enter your e-mail address. You will receive e-mail notification when new information is available in [address]. 9. Click Sign Up. You can now view and download portions of your medical record. 10. Click the Download Summary menu link to download a portable copy of your medical information. If you have questions, please visit the Frequently Asked Questions section of the [employer] website. Remember, [employer]Code is NOT to be used for urgent needs. For medical emergencies, dial 911. Now available from your iPhone and Android! Please provide this summary of care documentation to your next provider. Your primary care clinician is listed as Astrid Cardoso. If you have any questions after today's visit, please call [phone].

## 2017-03-10 NOTE — PROGRESS NOTES
[address]  [address], [address]  [place], [address] [place] [address] Street  Phone: [phone]  Fax: [phone]  PROGRESS NOTE  Patient: Dorian Padgett                MRN: [id number]       SSN: [id number]  YOB: 1955        AGE: 62 y.o. SEX: female  Body mass index is 29.86 kg/(m^2). PCP: Nik Snow MD  03/10/17    No chief complaint on file. HISTORY OF PRESENT ILLNESS, RADIOGRAPHS, and PLAN:     HISTORY:  Ms. Mag Aguilar returns today. I reviewed the MRI of her hips. It showed some lymphadenopathy but no intrinsic hip pathology. Her EMG indicates a sensory neuropathy, moderately severe with some motor neuropathy, no radiculopathy. This, again, is consistent with this not being a spinal stenotic condition but a peripheral neuropathic issue. I tried to contact her Infectious Disease doctor, but she was not in her office. At this time, again, I am not convinced that this is an issue with her spine. Again, she has no back pain, no classic stenotic symptoms. She has primary groin and anterior thigh pain that rarely goes below the knee and occasionally goes down to the foot. She can ride a bicycle forever. She gets no relief from injections. She has a sensory neuropathy on EMG. No radiculopathy is noted. ASSESSMENT/PLAN: I am going to get a CT of her abdomen and pelvis. She has some right lower quadrant pain. I want to make sure there is no other pathology brewing there that would explain this right greater than left anterior thigh and groin pain. I will see her back following those studies. I provided her some Norco for pain relief. She may need to be placed on some neuropathics. I will see her back following the CT of the abdomen and pelvis. Again, while the patient does have some degree of spinal stenosis, it is anything but clear that this is causing her any significant symptoms. cc: Javier Scott M.D.          Sean Santana, P.A.         Past Medical History:   Diagnosis Date    HIV disease ([facility] [address]     Hypertension     Infectious disease     HIV, hepatitis C    Liver disease     Heatitis C    Psychiatric disorder     depression       Family History   Problem Relation Age of Onset    Family history unknown: Yes       Current Outpatient Prescriptions   Medication Sig Dispense Refill    methocarbamol (ROBAXIN) 500 mg tablet Methocarbamol 500 MG Oral Tablet  TAKE 1 TABLET Every 8 hours PRN low back, hip, leg pain   Quantity: 1;  Refills: 0       Ml Carcamo M.D.;  Started [id number]  [id number] Tablet Bottle      albuterol (PROAIR HFA) 90 mcg/actuation inhaler 2 Puffs.  calcium-vitamin D (OYSTER SHELL) 500 mg(1,250mg) -200 unit per tablet Take 1 Tab by Mouth 3 Times Daily. Please take calcium per the following instructions: dispense 84 tabs2 tablets (1g) three times daily for 1 week, 2 tablets (1g) twice daily for 1 week,2 tablets (1g) once daily for 1 week.  gabapentin (NEURONTIN) 100 mg capsule 100 mg.  hydroCHLOROthiazide (MICROZIDE) 12.5 mg capsule 25 mg.      lisinopril (PRINIVIL, ZESTRIL) 20 mg tablet 20 mg.      amLODIPine (NORVASC) 10 mg tablet   0    amitriptyline (ELAVIL) 25 mg tablet take 1 tablet by mouth at bedtime  0    [facility] Rx for outpatient rollator for lumbar radiculopathy with leg weakness. High fall risk. 1 Each 0    levothyroxine (SYNTHROID) 100 mcg tablet Take 100 mcg by mouth Daily (before breakfast).       docusate sodium (COL-RITE) 100 mg capsule RA Col-Rite 100 MG Oral Capsule  take 1 capsule by mouth twice a day for 14 days   Quantity: 30;  Refills: 0     Started 2-May-2016  Active      amoxicillin (AMOXIL) 875 mg tablet take 1 tablet by mouth every 12 hours  0    ciprofloxacin HCl (CIPRO) 250 mg tablet take 1 tablet by mouth twice a day  0    DESCOVY tablet   0    lisinopril (PRINIVIL, ZESTRIL) 10 mg tablet take 1 tablet by mouth once daily  0    albuterol (PROVENTIL HFA, VENTOLIN HFA, PROAIR HFA) 90 mcg/actuation inhaler 2 Puffs.  benzonatate (TESSALON PERLES) 100 mg capsule Take 1 capsule 3 times daily as needed for coughing, swallow capsules whole.  diazePAM (VALIUM) 5 mg tablet 5 mg.  doxycycline (MONODOX) 100 mg capsule 100 mg.      naproxen (NAPROSYN) 500 mg tablet 500 mg.      neomycin-polymyxin-hydrocortisone, buffered, (PEDIOTIC) 3.5-10,000-1 mg/mL-unit/mL-% otic suspension Instill 4 Drops in both ears 4 Times Daily.  oxyCODONE-acetaminophen (PERCOCET) 5-325 mg per tablet Take 1 Tab by Mouth Every 6 Hours As Needed. Do not exceed 4000 mg of acetaminophen per day.  polyethylene glycol (MIRALAX) 17 gram/dose powder 17 g.      diclofenac EC (VOLTAREN) 75 mg EC tablet take 1 tablet once daily if needed  0    ibuprofen (MOTRIN) 600 mg tablet Take 1 Tab by mouth every six (6) hours as needed for Pain. 180 Tab 2    cyclobenzaprine (FLEXERIL) 10 mg tablet Take 1 Tab by mouth three (3) times daily as needed for Muscle Spasm(s). 90 Tab 2    amLODIPine (NORVASC) 5 mg tablet 5 mg.  ritonavir (NORVIR) 100 mg tablet Norvir 100 MG Oral Tablet  TAKE 1 TABLET DAILY   Quantity: 30;  Refills: 5       CAILIN TOLBERT P.A.;  Started 25-Aug-2010  Active      atazanavir (REYATAZ) 300 mg capsule Take 300 mg by mouth daily.  emtricitabine-tenofovir (TRUVADA) 200-300 mg per tablet Take 1 Tab by mouth daily. No Known Allergies    Past Surgical History:   Procedure Laterality Date    HX HYSTERECTOMY         Past Medical History:   Diagnosis Date    HIV disease ([facility] [address]     Hypertension     Infectious disease     HIV, hepatitis C    Liver disease     Heatitis C    Psychiatric disorder     depression       Social History     Social History    Marital status:      Spouse name: N/A    Number of children: N/A    Years of education: N/A     Occupational History    Not on file.      Social History Main Topics    Smoking status: Never Smoker  Smokeless tobacco: Never Used    Alcohol use Yes      Comment: occasionally    Drug use: Yes     Special: Cocaine      Comment: hx of cocaine use    Sexual activity: Not on file     Other Topics Concern    Not on file     Social History Narrative       REVIEW OF SYSTEMS:   CONSTITUTIONAL SYMPTOMS:  Negative. EYES:  Negative. EARS, NOSE, THROAT AND MOUTH:  Negative. CARDIOVASCULAR:  Negative. RESPIRATORY:  Negative. GENITOURINARY: Negative. GASTROINTESTINAL:  Negative. INTEGUMENTARY (SKIN AND/OR BREAST):  Negative. MUSCULOSKELETAL: Per HPI.   ENDOCRINE/RHEUMATOLOGIC:  Negative. NEUROLOGICAL:  Per HPI. HEMATOLOGIC/LYMPHATIC:  Negative. ALLERGIC/IMMUNOLOGIC:  Negative. PSYCHIATRIC:  Negative. PHYSICAL EXAMINATION:   Visit Vitals    BP (!) 173/98    Pulse 72    Temp 98.1 °F (36.7 °C) (Oral)    Resp 18    Ht 5' 6\" (1.676 m)    Wt 185 lb (83.9 kg)    SpO2 98%    BMI 29.86 kg/m2    PAIN SCALE: /10    CONSTITUTIONAL: The patient is in no apparent distress and is alert and oriented x 3. HEENT: Normocephalic. Hearing grossly intact. NECK: Supple and symmetric. no tenderness, or masses were felt. RESPIRATORY: No labored breathing. CARDIOVASCULAR: The carotid pulses were normal. Peripheral pulses were 2+. CHEST: Normal AP diameter and normal contour without any kyphoscoliosis. LYMPHATIC: No lymphadenopathy was appreciated in the neck, axillae or groin. SKIN:  Negative for scars, rashes, lesions, or ulcers on the right upper, right lower, left upper, left lower and trunk. NEUROLOGICAL: Alert and oriented x 3. Presents in wheelchair. EXTREMITIES: See musculoskeletal.  MUSCULOSKELETAL:   Head and Neck:  Negative for misalignment, asymmetry, crepitation, defects, tenderness masses or effusions.  Left Upper Extremity: Inspection, percussion and palpation preformed. Santillans sign is negative.  Right Upper Extremity: Inspection, percussion and palpation preformed. Santillans sign is negative.  Spine, Ribs and Pelvis: Back pain that radiates into anterior RLE. Short standing and walking tolerance. Inspection, percussion and palpation preformed. Negative for misalignment, asymmetry, crepitation, defects, tenderness masses or effusions.  Left Lower Extremity: Stiffness in knee. spection, percussion and palpation preformed. Negative straight leg raise.  Right Lower Extremity: Pain. Inspection, percussion and palpation preformed. Negative straight leg raise. SPINE EXAM:     Lumbar spine: No rash, ecchymosis, or gross obliquity. No focal atrophy is noted. ASSESSMENT    ICD-10-CM ICD-9-CM    1. Lumbar spinal stenosis M48.06 724.02 CT CHEST ABD PELV W WO CONT   2. Neuropathic pain M79.2 729.2 CT CHEST ABD PELV W WO CONT   3.  Right hip pain M25.551 719.45 CT CHEST ABD PELV W WO CONT       Written by Ebony Chiu, as dictated by Colt De León MD.

## 2017-03-13 ENCOUNTER — TELEPHONE (OUTPATIENT)
Dept: ORTHOPEDIC SURGERY | Age: 62
End: 2017-03-13

## 2017-03-13 DIAGNOSIS — R10.31 RIGHT LOWER QUADRANT PAIN: Primary | ICD-10-CM

## 2017-03-13 NOTE — TELEPHONE ENCOUNTER
Order and office notes faxed to [facility] scheduling to set up Ct of abdomen and pelvis.

## 2017-03-22 ENCOUNTER — TELEPHONE (OUTPATIENT)
Dept: ORTHOPEDIC SURGERY | Age: 62
End: 2017-03-22

## 2017-03-22 NOTE — TELEPHONE ENCOUNTER
Order for a CT Chest Abd and pelvis with and without contrast was placed on 03/10/17. Your note states that you want a Ct of just Abd and pelvis. Can you please clarify what you want ordered. [facility] is questioning before they can schedule patient.   Thank You

## 2017-03-22 NOTE — TELEPHONE ENCOUNTER
Abner Call from [address] [facility],[address] called wanting to confirm pt orders for tomorrow 03/23/2017 CT scan. Abner Call states that office notes say just abdomen and pelvis but orders say abdomen,chest and pelvis. Please call and verify orders with Abner Clal at [phone].

## 2017-03-22 NOTE — TELEPHONE ENCOUNTER
Please see message below and advise. Should they go by Dr. Lupillo Foster notes or the actual order.  Thank you

## 2017-03-27 ENCOUNTER — TELEPHONE (OUTPATIENT)
Dept: ORTHOPEDIC SURGERY | Age: 62
End: 2017-03-27

## 2017-03-27 DIAGNOSIS — R10.31 RIGHT LOWER QUADRANT PAIN: Primary | ICD-10-CM

## 2017-03-27 NOTE — TELEPHONE ENCOUNTER
Patient's insurance has denied the CT of Chest, Abdomen and Pelvis W / W/O Contrast. I have the paperwork stating the reason for the denial. Each test has separate reason.  Will need to do Peer to Peer

## 2017-03-28 ENCOUNTER — TELEPHONE (OUTPATIENT)
Dept: ORTHOPEDIC SURGERY | Age: 62
End: 2017-03-28

## 2017-03-28 NOTE — TELEPHONE ENCOUNTER
Order and office notes faxed to [facility] Scheduling to have them set up Ultrasound of Pelvis and to notify patient of date. [facility] will authorize with insurance if needed. Patient aware.

## 2017-04-13 DIAGNOSIS — R10.31 RIGHT LOWER QUADRANT PAIN: ICD-10-CM

## 2017-04-28 ENCOUNTER — OFFICE VISIT (OUTPATIENT)
Dept: ORTHOPEDIC SURGERY | Age: 62
End: 2017-04-28

## 2017-04-28 VITALS
BODY MASS INDEX: 32.05 KG/M2 | RESPIRATION RATE: 18 BRPM | OXYGEN SATURATION: 99 % | SYSTOLIC BLOOD PRESSURE: 141 MMHG | HEIGHT: 66 IN | HEART RATE: 75 BPM | DIASTOLIC BLOOD PRESSURE: 85 MMHG | TEMPERATURE: 97.6 F | WEIGHT: 199.4 LBS

## 2017-04-28 DIAGNOSIS — M79.2 NEUROPATHIC PAIN: Primary | ICD-10-CM

## 2017-04-28 RX ORDER — GABAPENTIN 300 MG/1
300 CAPSULE ORAL 3 TIMES DAILY
Qty: 90 CAP | Refills: 3 | Status: SHIPPED | OUTPATIENT
Start: 2017-04-28 | End: 2017-09-25

## 2017-04-28 NOTE — PATIENT INSTRUCTIONS
Neuropathic Pain: Care Instructions  Your Care Instructions  Neuropathic pain is caused by pressure on or damage to your nerves. It's often simply called nerve pain. Some people feel this type of pain all the time. For others, it comes and goes. Diabetes, shingles, or an injury can cause nerve pain. Many people say the pain feels sharp, burning, or stabbing. But some people feel it as a dull ache. In some cases, it makes your skin very sensitive. So touch, pressure, and other sensations that did not hurt before may now cause pain. It's important to know that this kind of pain is real and can affect your quality of life. It's also important to know that treatment can help. Treatment includes pain medicines, exercise, and physical therapy. Medicines can help reduce the number of pain signals that travel over the nerves. This can make the painful areas less sensitive. It can also help you sleep better and improve your mood. But medicines are only one part of successful treatment. Most people do best with more than one kind of treatment. Your doctor may recommend that you try cognitive-behavioral therapy and stress management. Or, if needed, you may decide to try to quit smoking, lower your blood pressure, or better control blood sugar. These kinds of healthy changes can also make a difference. If you feel that your treatment is not working, talk to your doctor. And be sure to tell your doctor if you think you might be depressed or anxious. These are common problems that can also be treated. Follow-up care is a key part of your treatment and safety. Be sure to make and go to all appointments, and call your doctor if you are having problems. It's also a good idea to know your test results and keep a list of the medicines you take. How can you care for yourself at home? · Be safe with medicines. Read and follow all instructions on the label.   ¨ If the doctor gave you a prescription medicine for pain, take it as prescribed. ¨ If you are not taking a prescription pain medicine, ask your doctor if you can take an over-the-counter medicine. · Save hard tasks for days when you have less pain. Follow a hard task with an easy task. And remember to take breaks. · Relax, and reduce stress. You may want to try deep breathing or meditation. These can help. · Keep moving. Gentle, daily exercise can help reduce pain. Your doctor or physical therapist can tell you what type of exercise is best for you. This may include walking, swimming, and stationary biking. It may also include stretches and range-of-motion exercises. · Try heat, cold packs, and massage. · Get enough sleep. Constant pain can make you more tired. If the pain makes it hard to sleep, talk with your doctor. · Think positively. Your thoughts can affect your pain. Do fun things to distract yourself from the pain. See a movie, read a book, listen to music, or spend time with a friend. · Keep a pain diary. Try to write down how strong your pain is and what it feels like. Also try to notice and write down how your moods, thoughts, sleep, activities, and medicine affect your pain. These notes can help you and your doctor find the best ways to treat your pain. Reducing constipation caused by pain medicine  Pain medicines often cause constipation. To reduce constipation:  · Include fruits, vegetables, beans, and whole grains in your diet each day. These foods are high in fiber. · Drink plenty of fluids, enough so that your urine is light yellow or clear like water. If you have kidney, heart, or liver disease and have to limit fluids, talk with your doctor before you increase the amount of fluids you drink. · Get some exercise every day. Build up slowly to 30 to 60 minutes a day on 5 or more days of the week. · Take a fiber supplement, such as Citrucel or Metamucil, every day if needed. Read and follow all instructions on the label.   · Schedule time each day for a bowel movement. Having a daily routine may help. Take your time and do not strain when having a bowel movement. · Ask your doctor about a laxative. The goal is to have one easy bowel movement every 1 to 2 days. Do not let constipation go untreated for more than 3 days. When should you call for help? Call your doctor now or seek immediate medical care if:  · You feel sad, anxious, or hopeless for more than a few days. This could mean you are depressed. Depression is common in people who have a lot of pain. But it can be treated. · You have trouble with bowel movements, such as:  ¨ No bowel movement in 3 days. ¨ Blood in the anal area, in your stool, or on the toilet paper. ¨ Diarrhea for more than 24 hours. Watch closely for changes in your health, and be sure to contact your doctor if:  · Your pain is getting worse. · You can't sleep because of pain. · You are very worried or anxious about your pain. · You have trouble taking your pain medicine. · You have any concerns about your pain medicine or its side effects. · You have vomiting or cramps for more than 2 hours. Where can you learn more? Go to http://mouna-noris.info/. Enter Q668 in the search box to learn more about \"Neuropathic Pain: Care Instructions. \"  Current as of: October 14, 2016  Content Version: 11.2  © 8206-1474 [employer]. Care instructions adapted under license by [employer] (which disclaims liability or warranty for this information). If you have questions about a medical condition or this instruction, always ask your healthcare professional. [place] [address] any warranty or liability for your use of this information.

## 2017-04-28 NOTE — PROGRESS NOTES
Marlon Blackkhadijah [facility] [address]  [address], [address]  [place], [facility] [address]  Phone: [phone]  Fax: [phone]  PROGRESS NOTE  Patient: Leann Champagne                MRN: [id number]       SSN: [id number]  YOB: 1955        AGE: 62 y.o. SEX: female  Body mass index is 32.18 kg/(m^2). PCP: Vern Johansen MD  04/28/17    Chief Complaint   Patient presents with    Hip Pain     CT scan follow up       HISTORY OF PRESENT ILLNESS, RADIOGRAPHS, and PLAN:     HISTORY:  Ms. Carolina Payan is seen today in followup. She has had improvement with her Gabapentin. It has improved her pain significantly. She rates the pain as a 0/10. This is consistent with our finding of peripheral neuropathy on her EMGs. The ultrasound of her abdomen and pelvis came back normal.     ASSESSMENT/PLAN: At this point, I do not have any roll for further followup. I do not believe this is spinal pathology. I believe she has a peripheral neuropathy. I am not certain the etiology of it. Her complaints do not believe to be spinal related to me. Her exam is benign. I will see her back again as-needed. I provided her a script for Neurontin. Continued maintenance of this medication will be provided through her primary care doctor. I told her to discuss her neuropathy with her primary care doctor, as well as her Infectious Disease doctor and see if any of her maintenance medication have side effects that can cause neuropathic symptoms. I will see her again as-needed.      cc: Grace Willingham        Past Medical History:   Diagnosis Date    HIV disease ([facility] [address]     Hypertension     Infectious disease     HIV, hepatitis C    Liver disease     Heatitis C    Psychiatric disorder     depression       Family History   Problem Relation Age of Onset    Family history unknown: Yes       Current Outpatient Prescriptions   Medication Sig Dispense Refill    gabapentin (NEURONTIN) 300 mg capsule Take 1 Cap by mouth three (3) times daily. 90 Cap 0    DESCOVY tablet   0    albuterol (PROVENTIL HFA, VENTOLIN HFA, PROAIR HFA) 90 mcg/actuation inhaler 2 Puffs.  albuterol (PROAIR HFA) 90 mcg/actuation inhaler 2 Puffs.  lisinopril (PRINIVIL, ZESTRIL) 20 mg tablet 20 mg.      amLODIPine (NORVASC) 10 mg tablet   0    ibuprofen (MOTRIN) 600 mg tablet Take 1 Tab by mouth every six (6) hours as needed for Pain. 180 Tab 2    Walker misc Rx for outpatient rollator for lumbar radiculopathy with leg weakness. High fall risk. 1 Each 0    atazanavir (REYATAZ) 300 mg capsule Take 300 mg by mouth daily.  levothyroxine (SYNTHROID) 100 mcg tablet Take 100 mcg by mouth Daily (before breakfast).  methocarbamol (ROBAXIN) 500 mg tablet Methocarbamol 500 MG Oral Tablet  TAKE 1 TABLET Every 8 hours PRN low back, hip, leg pain   Quantity: 1;  Refills: 0       Taurus Cloud M.D.;  Doni Toledo [id number]  [id number] Tablet Bottle      docusate sodium (COL-RITE) 100 mg capsule RA Col-Rite 100 MG Oral Capsule  take 1 capsule by mouth twice a day for 14 days   Quantity: 30;  Refills: 0     Started 2-May-2016  Active      amoxicillin (AMOXIL) 875 mg tablet take 1 tablet by mouth every 12 hours  0    ciprofloxacin HCl (CIPRO) 250 mg tablet take 1 tablet by mouth twice a day  0    lisinopril (PRINIVIL, ZESTRIL) 10 mg tablet take 1 tablet by mouth once daily  0    HYDROcodone-acetaminophen (NORCO) 5-325 mg per tablet Take 1 Tab by mouth every eight (8) hours as needed for Pain. Max Daily Amount: 3 Tabs. 50 Tab 0    benzonatate (TESSALON PERLES) 100 mg capsule Take 1 capsule 3 times daily as needed for coughing, swallow capsules whole.  calcium-vitamin D (OYSTER SHELL) 500 mg(1,250mg) -200 unit per tablet Take 1 Tab by Mouth 3 Times Daily.  Please take calcium per the following instructions: dispense 84 tabs2 tablets (1g) three times daily for 1 week, 2 tablets (1g) twice daily for 1 week,2 tablets (1g) once daily for 1 week.  diazePAM (VALIUM) 5 mg tablet 5 mg.  doxycycline (MONODOX) 100 mg capsule 100 mg.      gabapentin (NEURONTIN) 100 mg capsule 100 mg.  hydroCHLOROthiazide (MICROZIDE) 12.5 mg capsule 25 mg.      naproxen (NAPROSYN) 500 mg tablet 500 mg.      neomycin-polymyxin-hydrocortisone, buffered, (PEDIOTIC) 3.5-10,000-1 mg/mL-unit/mL-% otic suspension Instill 4 Drops in both ears 4 Times Daily.  oxyCODONE-acetaminophen (PERCOCET) 5-325 mg per tablet Take 1 Tab by Mouth Every 6 Hours As Needed. Do not exceed 4000 mg of acetaminophen per day.  polyethylene glycol (MIRALAX) 17 gram/dose powder 17 g.      diclofenac EC (VOLTAREN) 75 mg EC tablet take 1 tablet once daily if needed  0    cyclobenzaprine (FLEXERIL) 10 mg tablet Take 1 Tab by mouth three (3) times daily as needed for Muscle Spasm(s). 90 Tab 2    amitriptyline (ELAVIL) 25 mg tablet take 1 tablet by mouth at bedtime  0    amLODIPine (NORVASC) 5 mg tablet 5 mg.  ritonavir (NORVIR) 100 mg tablet Norvir 100 MG Oral Tablet  TAKE 1 TABLET DAILY   Quantity: 30;  Refills: 5       CAILIN TOLBERT P.A.;  Started 25-Aug-2010  Active      emtricitabine-tenofovir (TRUVADA) 200-300 mg per tablet Take 1 Tab by mouth daily. No Known Allergies    Past Surgical History:   Procedure Laterality Date    HX HYSTERECTOMY         Past Medical History:   Diagnosis Date    HIV disease ([facility] [address]     Hypertension     Infectious disease     HIV, hepatitis C    Liver disease     Heatitis C    Psychiatric disorder     depression       Social History     Social History    Marital status:      Spouse name: N/A    Number of children: N/A    Years of education: N/A     Occupational History    Not on file.      Social History Main Topics    Smoking status: Never Smoker    Smokeless tobacco: Never Used    Alcohol use Yes      Comment: occasionally    Drug use: Yes     Special: Cocaine      Comment: hx of cocaine use    Sexual activity: Not on file     Other Topics Concern    Not on file     Social History Narrative       REVIEW OF SYSTEMS:   CONSTITUTIONAL SYMPTOMS:  Negative. EYES:  Negative. EARS, NOSE, THROAT AND MOUTH:  Negative. CARDIOVASCULAR:  Negative. RESPIRATORY:  Negative. GENITOURINARY: Negative. GASTROINTESTINAL:  Negative. INTEGUMENTARY (SKIN AND/OR BREAST):  Negative. MUSCULOSKELETAL: Per HPI.   ENDOCRINE/RHEUMATOLOGIC:  Negative. NEUROLOGICAL:  Per HPI. HEMATOLOGIC/LYMPHATIC:  Negative. ALLERGIC/IMMUNOLOGIC:  Negative. PSYCHIATRIC:  Negative. PHYSICAL EXAMINATION:   Visit Vitals    /85    Pulse 75    Temp 97.6 °F (36.4 °C) (Oral)    Resp 18    Ht 5' 6\" (1.676 m)    Wt 199 lb 6.4 oz (90.4 kg)    SpO2 99%    BMI 32.18 kg/m2    PAIN SCALE: 0 - No pain/10    CONSTITUTIONAL: The patient is in no apparent distress and is alert and oriented x 3. HEENT: Normocephalic. Hearing grossly intact. NECK: Supple and symmetric. no tenderness, or masses were felt. RESPIRATORY: No labored breathing. CARDIOVASCULAR: The carotid pulses were normal. Peripheral pulses were 2+. CHEST: Normal AP diameter and normal contour without any kyphoscoliosis. LYMPHATIC: No lymphadenopathy was appreciated in the neck, axillae or groin. SKIN: Negative for scars, rashes, lesions, or ulcers on the right upper, right lower, left upper, left lower and trunk. NEUROLOGICAL: Alert and oriented x 3. Ambulation with single point cane. FWB. EXTREMITIES: See musculoskeletal.  MUSCULOSKELETAL:   Head and Neck:  Negative for misalignment, asymmetry, crepitation, defects, tenderness masses or effusions.  Left Upper Extremity: Inspection, percussion and palpation preformed. Santillans sign is negative.  Right Upper Extremity: Inspection, percussion and palpation preformed. Santillans sign is negative.  Spine, Ribs and Pelvis: Back pain. Inspection, percussion and palpation preformed.  Negative for misalignment, asymmetry, crepitation, defects, tenderness masses or effusions.  Left Lower Extremity: Stiffness in knee. Inspection, percussion and palpation preformed. Negative straight leg raise.  Right Lower Extremity: pain. Inspection, percussion and palpation preformed. Negative straight leg raise. SPINE EXAM:     Lumbar spine: No rash, ecchymosis, or gross obliquity. No focal atrophy is noted. ASSESSMENT    ICD-10-CM ICD-9-CM    1. Neuropathic pain M79.2 729.2        Written by Bill Avery, as dictated by Tony Cerrato MD.    I, Dr. Tony Cerrato MD, confirm that all documentation is accurate.

## 2017-05-05 ENCOUNTER — TELEPHONE (OUTPATIENT)
Dept: ORTHOPEDIC SURGERY | Age: 62
End: 2017-05-05

## 2017-05-05 NOTE — TELEPHONE ENCOUNTER
Pt was last seen 4/28/17, has f/u 6/16/17:  Pt states she's very confused after her last visit with dr Irineo Barakat. Pt needs better explanation of what's going on with her. Pt commented when she was asked about her pain level she replied she's not painful but failed to express that's only while sitting -- when she stands her pain is a 10/10. Pt reports since on gabapentin she experiencing swelling of the left eye, legs/feet/hands. Pt states no diagnostics were ordered but her f/u for 6/16/17 reflects results f/u?  Please advise pt p#[phone]

## 2017-05-08 NOTE — TELEPHONE ENCOUNTER
Called patient, per Dr. Lashanda Liu note on 04/28/17,     Patient would need to have PCP address neuropathy as he feels it is not coming from her spine and there is nothing he can do at this time for her pain. Patient is to also have Dr. Yun Miller (PCP) to maintain/adjust dose of gabapentin. Informed patient of above. Patient verbalized agreement/understanding. Per patient she would like to keep her appt at this time with Dr. Leonel Jimenez, and will f/u with Dr. Yun Miller to see if she needs to keep the appt. No further action required at this time.

## 2017-09-25 ENCOUNTER — ANESTHESIA EVENT (OUTPATIENT)
Dept: ENDOSCOPY | Age: 62
End: 2017-09-25
Payer: MEDICAID

## 2017-09-26 ENCOUNTER — HOSPITAL ENCOUNTER (OUTPATIENT)
Age: 62
Setting detail: OUTPATIENT SURGERY
Discharge: HOME | End: 2017-09-26
Attending: INTERNAL MEDICINE | Admitting: INTERNAL MEDICINE
Payer: MEDICAID

## 2017-09-26 ENCOUNTER — ANESTHESIA (OUTPATIENT)
Dept: ENDOSCOPY | Age: 62
End: 2017-09-26
Payer: MEDICAID

## 2017-09-26 VITALS
BODY MASS INDEX: 31.39 KG/M2 | SYSTOLIC BLOOD PRESSURE: 135 MMHG | DIASTOLIC BLOOD PRESSURE: 85 MMHG | WEIGHT: 200 LBS | RESPIRATION RATE: 16 BRPM | TEMPERATURE: 97.1 F | HEIGHT: 67 IN | HEART RATE: 67 BPM | OXYGEN SATURATION: 98 %

## 2017-09-26 LAB
AMPHET UR QL SCN: NEGATIVE
BARBITURATES UR QL SCN: NEGATIVE
BENZODIAZ UR QL: NEGATIVE
CANNABINOIDS UR QL SCN: NEGATIVE
COCAINE UR QL SCN: NEGATIVE
HDSCOM,HDSCOM: NORMAL
METHADONE UR QL: NEGATIVE
OPIATES UR QL: NEGATIVE
PCP UR QL: NEGATIVE

## 2017-09-26 PROCEDURE — 74011250636 HC RX REV CODE- 250/636

## 2017-09-26 PROCEDURE — 76060000032 HC ANESTHESIA 0.5 TO 1 HR: Performed by: INTERNAL MEDICINE

## 2017-09-26 PROCEDURE — 77030009426 HC FCPS BIOP ENDOSC BSC -B: Performed by: INTERNAL MEDICINE

## 2017-09-26 PROCEDURE — 74011000250 HC RX REV CODE- 250

## 2017-09-26 PROCEDURE — 88305 TISSUE EXAM BY PATHOLOGIST: CPT | Performed by: INTERNAL MEDICINE

## 2017-09-26 PROCEDURE — 80307 DRUG TEST PRSMV CHEM ANLYZR: CPT | Performed by: ANESTHESIOLOGY

## 2017-09-26 PROCEDURE — 77030031670 HC DEV INFL ENDOTEK BIG60 MRTM -B: Performed by: INTERNAL MEDICINE

## 2017-09-26 PROCEDURE — 76040000007: Performed by: INTERNAL MEDICINE

## 2017-09-26 PROCEDURE — 74011250636 HC RX REV CODE- 250/636: Performed by: NURSE ANESTHETIST, CERTIFIED REGISTERED

## 2017-09-26 RX ORDER — PROPOFOL 10 MG/ML
INJECTION, EMULSION INTRAVENOUS AS NEEDED
Status: DISCONTINUED | OUTPATIENT
Start: 2017-09-26 | End: 2017-09-26 | Stop reason: HOSPADM

## 2017-09-26 RX ORDER — PROPOFOL 10 MG/ML
INJECTION, EMULSION INTRAVENOUS
Status: DISCONTINUED | OUTPATIENT
Start: 2017-09-26 | End: 2017-09-26 | Stop reason: HOSPADM

## 2017-09-26 RX ORDER — SODIUM CHLORIDE, SODIUM LACTATE, POTASSIUM CHLORIDE, CALCIUM CHLORIDE 600; 310; 30; 20 MG/100ML; MG/100ML; MG/100ML; MG/100ML
125 INJECTION, SOLUTION INTRAVENOUS CONTINUOUS
Status: CANCELLED | OUTPATIENT
Start: 2017-09-26

## 2017-09-26 RX ORDER — LIDOCAINE HYDROCHLORIDE 20 MG/ML
INJECTION, SOLUTION EPIDURAL; INFILTRATION; INTRACAUDAL; PERINEURAL AS NEEDED
Status: DISCONTINUED | OUTPATIENT
Start: 2017-09-26 | End: 2017-09-26 | Stop reason: HOSPADM

## 2017-09-26 RX ORDER — SODIUM CHLORIDE, SODIUM LACTATE, POTASSIUM CHLORIDE, CALCIUM CHLORIDE 600; 310; 30; 20 MG/100ML; MG/100ML; MG/100ML; MG/100ML
75 INJECTION, SOLUTION INTRAVENOUS CONTINUOUS
Status: DISCONTINUED | OUTPATIENT
Start: 2017-09-27 | End: 2017-09-26 | Stop reason: HOSPADM

## 2017-09-26 RX ADMIN — SODIUM CHLORIDE, SODIUM LACTATE, POTASSIUM CHLORIDE, AND CALCIUM CHLORIDE 75 ML/HR: 600; 310; 30; 20 INJECTION, SOLUTION INTRAVENOUS at 10:00

## 2017-09-26 RX ADMIN — LIDOCAINE HYDROCHLORIDE 40 MG: 20 INJECTION, SOLUTION EPIDURAL; INFILTRATION; INTRACAUDAL; PERINEURAL at 10:29

## 2017-09-26 RX ADMIN — PROPOFOL 100 MCG/KG/MIN: 10 INJECTION, EMULSION INTRAVENOUS at 10:29

## 2017-09-26 RX ADMIN — PROPOFOL 100 MG: 10 INJECTION, EMULSION INTRAVENOUS at 10:29

## 2017-09-26 NOTE — H&P
Gastroenterology Consult     Referring Physician: Nahum Freitas    Consult Date: 9/26/2017     Subjective:     Chief Complaint: screening   History of Present Illness: Mt Amos is a 65 y.o. female who is seen in consultation for screening colonoscopy. Past Medical History:   Diagnosis Date    Arthritis     Right leg    Hep C w/ coma, chronic (HCC)     HIV (human immunodeficiency virus infection) ([address]     HIV disease (HCC)     Hypertension     Infectious disease     HIV, hepatitis C    Insomnia     Liver disease     Heatitis C    Psychiatric disorder     depression, anxiety    Thyroid disease     had thyroidectomy     Past Surgical History:   Procedure Laterality Date    HX HYSTERECTOMY        Family History   Problem Relation Age of Onset    Family history unknown: Yes     Social History   Substance Use Topics    Smoking status: Never Smoker    Smokeless tobacco: Never Used    Alcohol use No      Comment: occasionally      No Known Allergies  Current Facility-Administered Medications   Medication Dose Route Frequency    [START ON 9/27/2017] lactated Ringers infusion  75 mL/hr IntraVENous CONTINUOUS        Review of Systems:  A detailed 10 organ review of systems is obtained with pertinent positives as listed in the History of Present Illness and Past Medical History. All others are negative. Objective:     Physical Exam:  Visit Vitals    BP (!) 140/94    Pulse 63    Temp 97.1 °F (36.2 °C)    Resp 16    Ht 5' 7\" (1.702 m)    Wt 90.7 kg (200 lb)    SpO2 98%    BMI 31.32 kg/m2        Skin:  Extremities and face reveal no rashes. No payne erythema. No telangiectasias on the chest wall. HEENT: Sclerae anicteric. Extra-occular muscles are intact. No oral ulcers. No abnormal pigmentation of the lips. The neck is supple. Cardiovascular: Regular rate and rhythm. No murmurs, gallops, or rubs. PMI nondisplaced. Carotids without bruits.   Respiratory:  Comfortable breathing with no accessory muscle use. Clear breath sounds with no wheezes, rales, or rhonchi. GI:  Abdomen nondistended, soft, and nontender. Normal active bowel sounds. No enlargement of the liver or spleen. No masses palpable. Rectal:  Deferred  Musculoskeletal:  No pitting edema of the lower legs. Extremities have good range of motion. No costovertebral tenderness. Neurological:  Gross memory appears intact. Patient is alert and oriented. Psychiatric:  Mood appears appropriate with judgement intact. Lymphatic:  No cervical or supraclavicular adenopathy.       Assessment/Plan:     Screening colonoscopy as planned

## 2017-09-26 NOTE — IP AVS SNAPSHOT
Rosie Barger 
 
 
 [address] 
[phone] Patient: Brad Vallejo MRN: [id number] You are allergic to the following No active allergies Recent Documentation Height Weight BMI OB Status Smoking Status 1.702 m 90.7 kg 31.32 kg/m2 Hysterectomy Never Smoker Emergency Contacts Name Discharge Info Relation Home Work Mobile Chayo Lara CAREGIVER [3] Daughter [21]   [phone] About your hospitalization You were admitted on:  September 26, 2017 You last received care in the:  [address] [place] [address] Drive PHASE 2 RECOVERY You were discharged on:  September 26, 2017 Unit phone number:  [phone] Why you were hospitalized Your primary diagnosis was:  Not on File Providers Seen During Your Hospitalizations Provider Role Specialty Primary office phone Niru Barraza MD Attending Provider Gastroenterology [phone] Your Primary Care Physician (PCP) Primary Care Physician Office Phone Office Fax Calvin Montero [phone] Follow-up Information None Current Discharge Medication List  
  
ASK your doctor about these medications Dose & Instructions Dispensing Information Comments Morning Noon Evening Bedtime  
 amitriptyline 25 mg tablet Commonly known as:  ELAVIL Your last dose was: Your next dose is:    
   
   
 take 1 tablet by mouth at bedtime Refills:  0  
     
   
   
   
  
 amLODIPine 10 mg tablet Commonly known as:  Moo Vaughn Your last dose was: Your next dose is:    
   
   
 Dose:  10 mg Take 10 mg by mouth daily. Refills:  0 DESCOVY tablet Generic drug:  emtricitabine-tenofovir alafen Your last dose was: Your next dose is:    
   
   
  Refills:  0  
     
   
   
   
  
 hydroCHLOROthiazide 12.5 mg capsule Commonly known as:  Kelly Henry Your last dose was: Your next dose is:    
   
   
 Dose:  25 mg  
25 mg. Refills:  0  
     
   
   
   
  
 ibuprofen 600 mg tablet Commonly known as:  MOTRIN Your last dose was: Your next dose is:    
   
   
 Dose:  600 mg Take 1 Tab by mouth every six (6) hours as needed for Pain. Quantity:  180 Tab Refills:  2  
     
   
   
   
  
 levothyroxine 100 mcg tablet Commonly known as:  SYNTHROID Your last dose was: Your next dose is:    
   
   
 Dose:  100 mcg Take 100 mcg by mouth Daily (before breakfast). Refills:  0  
     
   
   
   
  
 lisinopril 10 mg tablet Commonly known as:  Catalino Shutters Your last dose was: Your next dose is:    
   
   
 take 1 tablet by mouth once daily Refills:  0 NORVIR 100 mg tablet Generic drug:  ritonavir Your last dose was: Your next dose is:    
   
   
 Norvir 100 MG Oral Tablet TAKE 1 TABLET DAILY  Quantity: 30;  Refills: 5    Vianey Mae P.A.;  Started 25-Aug-2010 Active Refills:  0  
     
   
   
   
  
 REYATAZ 300 mg capsule Generic drug:  atazanavir Your last dose was: Your next dose is:    
   
   
 Dose:  300 mg Take 300 mg by mouth daily. Refills:  0  
     
   
   
   
  
 TRUVADA 200-300 mg per tablet Generic drug:  emtricitabine-tenofovir (TDF) Your last dose was: Your next dose is:    
   
   
 Dose:  1 Tab Take 1 Tab by mouth daily. Refills:  0 Discharge Instructions Colonoscopy: What to Expect at [facility] Your Recovery After you have a colonoscopy, you will stay at the clinic for 1 to 2 hours until the medicines wear off. Then you can go home. But you will need to arrange for a ride. Your doctor will tell you when you can eat and do your other usual activities. Your doctor will talk to you about when you will need your next colonoscopy. Your doctor can help you decide how often you need to be checked. This will depend on the results of your test and your risk for colorectal cancer. After the test, you may be bloated or have gas pains. You may need to pass gas. If a biopsy was done or a polyp was removed, you may have streaks of blood in your stool (feces) for a few days. This care sheet gives you a general idea about how long it will take for you to recover. But each person recovers at a different pace. Follow the steps below to get better as quickly as possible. How can you care for yourself at home? Activity · Rest when you feel tired. · You can do your normal activities when it feels okay to do so. Diet · Follow your doctor's directions for eating. · Unless your doctor has told you not to, drink plenty of fluids. This helps to replace the fluids that were lost during the colon prep. · Do not drink alcohol. Medicines · Your doctor will tell you if and when you can restart your medicines. He or she will also give you instructions about taking any new medicines. · If you take blood thinners, such as warfarin (Coumadin), clopidogrel (Plavix), or aspirin, be sure to talk to your doctor. He or she will tell you if and when to start taking those medicines again. Make sure that you understand exactly what your doctor wants you to do. · If polyps were removed or a biopsy was done during the test, your doctor may tell you not to take aspirin or other anti-inflammatory medicines for a few days. These include ibuprofen (Advil, Motrin) and naproxen (Aleve). Other instructions · For your safety, do not drive or operate machinery until the medicine wears off and you can think clearly.  Your doctor may tell you not to drive or operate machinery until the day after your test. 
· Do not sign legal documents or make major decisions until the medicine wears off and you can think clearly. The anesthesia can make it hard for you to fully understand what you are agreeing to. Follow-up care is a key part of your treatment and safety. Be sure to make and go to all appointments, and call your doctor if you are having problems. It's also a good idea to know your test results and keep a list of the medicines you take. When should you call for help? Call 911 anytime you think you may need emergency care. For example, call if: 
· You passed out (lost consciousness). · You pass maroon or bloody stools. · You have severe belly pain. Call your doctor now or seek immediate medical care if: 
· Your stools are black and tarlike. · Your stools have streaks of blood, but you did not have a biopsy or any polyps removed. · You have belly pain, or your belly is swollen and firm. · You vomit. · You have a fever. · You are very dizzy. Watch closely for changes in your health, and be sure to contact your doctor if you have any problems. Where can you learn more? Go to http://mouna-noris.info/. Enter E264 in the search box to learn more about \"Colonoscopy: What to Expect at Home. \" Current as of: August 9, 2016 Content Version: 11.3 © 1943-3041 [employer]. Care instructions adapted under license by [employer] (which disclaims liability or warranty for this information). If you have questions about a medical condition or this instruction, always ask your healthcare professional. [place] [address] any warranty or liability for your use of this information. DISCHARGE SUMMARY from Nurse The following personal items are in your possession at time of discharge: 
 
Dental Appliances: None Visual Aid: Glasses PATIENT INSTRUCTIONS: 
 
After general anesthesia or intravenous sedation, for 24 hours or while taking prescription Narcotics: · Limit your activities · Do not drive and operate hazardous machinery · Do not make important personal or business decisions · Do  not drink alcoholic beverages · If you have not urinated within 8 hours after discharge, please contact your surgeon on call. Report the following to your surgeon: 
· Excessive pain, swelling, redness or odor of or around the surgical area · Temperature over 100.5 · Nausea and vomiting lasting longer than 4 hours or if unable to take medications · Any signs of decreased circulation or nerve impairment to extremity: change in color, persistent  numbness, tingling, coldness or increase pain · Any questions What to do at Home: These are general instructions for a healthy lifestyle: No smoking/ No tobacco products/ Avoid exposure to second hand smoke Surgeon General's Warning:  Quitting smoking now greatly reduces serious risk to your health. Obesity, smoking, and sedentary lifestyle greatly increases your risk for illness A healthy diet, regular physical exercise & weight monitoring are important for maintaining a healthy lifestyle You may be retaining fluid if you have a history of heart failure or if you experience any of the following symptoms:  Weight gain of 3 pounds or more overnight or 5 pounds in a week, increased swelling in our hands or feet or shortness of breath while lying flat in bed. Please call your doctor as soon as you notice any of these symptoms; do not wait until your next office visit. Recognize signs and symptoms of STROKE: 
 
F-face looks uneven A-arms unable to move or move unevenly S-speech slurred or non-existent T-time-call 911 as soon as signs and symptoms begin-DO NOT go Back to bed or wait to see if you get better-TIME IS BRAIN. Warning Signs of HEART ATTACK Call 911 if you have these symptoms: 
? Chest discomfort.  Most heart attacks involve discomfort in the center of the chest that lasts more than a few minutes, or that goes away and comes back. It can feel like uncomfortable pressure, squeezing, fullness, or pain. ? Discomfort in other areas of the upper body. Symptoms can include pain or discomfort in one or both arms, the back, neck, jaw, or stomach. ? Shortness of breath with or without chest discomfort. ? Other signs may include breaking out in a cold sweat, nausea, or lightheadedness. Don't wait more than five minutes to call [address]! Fast action can save your life. Calling 911 is almost always the fastest way to get lifesaving treatment. Emergency Medical Services staff can begin treatment when they arrive  up to an hour sooner than if someone gets to the hospital by car. The discharge information has been reviewed with the patient. The patient verbalized understanding. Discharge medications reviewed with the patient and appropriate educational materials and side effects teaching were provided. Patient armband removed and given to patient to take home. Patient was informed of the privacy risks if armband lost or stolen Discharge Orders None Introducing [facility]! Jordan Aguiar introduces Provasculon patient portal. Now you can access parts of your medical record, email your doctor's office, and request medication refills online. 1. In your internet browser, go to [email or web address] 2. Click on the First Time User? Click Here link in the Sign In box. You will see the New Member Sign Up page. 3. Enter your Provasculon Access Code exactly as it appears below. You will not need to use this code after youve completed the sign-up process. If you do not sign up before the expiration date, you must request a new code. · Provasculon Access Code: [email or web address] Expires: 12/25/2017  8:51 AM 
 
4.  Enter the last four digits of your Social Security Number (xxxx) and Date of Birth (mm/dd/yyyy) as indicated and click Submit. You will be taken to the next sign-up page. 5. Create a [email or web address] ID. This will be your [email or web address] login ID and cannot be changed, so think of one that is secure and easy to remember. 6. Create a [email or web address] password. You can change your password at any time. 7. Enter your Password Reset Question and Answer. This can be used at a later time if you forget your password. 8. Enter your e-mail address. You will receive e-mail notification when new information is available in [address] 9. Click Sign Up. You can now view and download portions of your medical record. 10. Click the Download Summary menu link to download a portable copy of your medical information. If you have questions, please visit the Frequently Asked Questions section of the [email or web address] website. Remember, [email or web address] is NOT to be used for urgent needs. For medical emergencies, dial 911. Now available from your iPhone and Android! General Information Please provide this summary of care documentation to your next provider. Patient Signature:  ____________________________________________________________ Date:  ____________________________________________________________  
  
[facility] Provider Signature:  ____________________________________________________________ Date:  ____________________________________________________________

## 2017-09-26 NOTE — DISCHARGE INSTRUCTIONS
Colonoscopy: What to Expect at [address] [place] [address]  After you have a colonoscopy, you will stay at the clinic for 1 to 2 hours until the medicines wear off. Then you can go home. But you will need to arrange for a ride. Your doctor will tell you when you can eat and do your other usual activities. Your doctor will talk to you about when you will need your next colonoscopy. Your doctor can help you decide how often you need to be checked. This will depend on the results of your test and your risk for colorectal cancer. After the test, you may be bloated or have gas pains. You may need to pass gas. If a biopsy was done or a polyp was removed, you may have streaks of blood in your stool (feces) for a few days. This care sheet gives you a general idea about how long it will take for you to recover. But each person recovers at a different pace. Follow the steps below to get better as quickly as possible. How can you care for yourself at home? Activity  · Rest when you feel tired. · You can do your normal activities when it feels okay to do so. Diet  · Follow your doctor's directions for eating. · Unless your doctor has told you not to, drink plenty of fluids. This helps to replace the fluids that were lost during the colon prep. · Do not drink alcohol. Medicines  · Your doctor will tell you if and when you can restart your medicines. He or she will also give you instructions about taking any new medicines. · If you take blood thinners, such as warfarin (Coumadin), clopidogrel (Plavix), or aspirin, be sure to talk to your doctor. He or she will tell you if and when to start taking those medicines again. Make sure that you understand exactly what your doctor wants you to do. · If polyps were removed or a biopsy was done during the test, your doctor may tell you not to take aspirin or other anti-inflammatory medicines for a few days. These include ibuprofen (Advil, Motrin) and naproxen (Aleve).   Other instructions  · For your safety, do not drive or operate machinery until the medicine wears off and you can think clearly. Your doctor may tell you not to drive or operate machinery until the day after your test.  · Do not sign legal documents or make major decisions until the medicine wears off and you can think clearly. The anesthesia can make it hard for you to fully understand what you are agreeing to. Follow-up care is a key part of your treatment and safety. Be sure to make and go to all appointments, and call your doctor if you are having problems. It's also a good idea to know your test results and keep a list of the medicines you take. When should you call for help? Call 911 anytime you think you may need emergency care. For example, call if:  · You passed out (lost consciousness). · You pass maroon or bloody stools. · You have severe belly pain. Call your doctor now or seek immediate medical care if:  · Your stools are black and tarlike. · Your stools have streaks of blood, but you did not have a biopsy or any polyps removed. · You have belly pain, or your belly is swollen and firm. · You vomit. · You have a fever. · You are very dizzy. Watch closely for changes in your health, and be sure to contact your doctor if you have any problems. Where can you learn more? Go to http://mouna-noris.info/. Enter E264 in the search box to learn more about \"Colonoscopy: What to Expect at Home. \"  Current as of: August 9, 2016  Content Version: 11.3  © 4032-2898 [employer]. Care instructions adapted under license by [employer] (which disclaims liability or warranty for this information). If you have questions about a medical condition or this instruction, always ask your healthcare professional. [place] [address] any warranty or liability for your use of this information.     DISCHARGE SUMMARY from Nurse    The following personal items are in your possession at time of discharge:    Dental Appliances: None  Visual Aid: Glasses                            PATIENT INSTRUCTIONS:    After general anesthesia or intravenous sedation, for 24 hours or while taking prescription Narcotics:  · Limit your activities  · Do not drive and operate hazardous machinery  · Do not make important personal or business decisions  · Do  not drink alcoholic beverages  · If you have not urinated within 8 hours after discharge, please contact your surgeon on call. Report the following to your surgeon:  · Excessive pain, swelling, redness or odor of or around the surgical area  · Temperature over 100.5  · Nausea and vomiting lasting longer than 4 hours or if unable to take medications  · Any signs of decreased circulation or nerve impairment to extremity: change in color, persistent  numbness, tingling, coldness or increase pain  · Any questions        What to do at Home:  These are general instructions for a healthy lifestyle:    No smoking/ No tobacco products/ Avoid exposure to second hand smoke    Surgeon General's Warning:  Quitting smoking now greatly reduces serious risk to your health. Obesity, smoking, and sedentary lifestyle greatly increases your risk for illness    A healthy diet, regular physical exercise & weight monitoring are important for maintaining a healthy lifestyle    You may be retaining fluid if you have a history of heart failure or if you experience any of the following symptoms:  Weight gain of 3 pounds or more overnight or 5 pounds in a week, increased swelling in our hands or feet or shortness of breath while lying flat in bed. Please call your doctor as soon as you notice any of these symptoms; do not wait until your next office visit.     Recognize signs and symptoms of STROKE:    F-face looks uneven    A-arms unable to move or move unevenly    S-speech slurred or non-existent    T-time-call 911 as soon as signs and symptoms begin-DO NOT go Back to bed or wait to see if you get better-TIME IS BRAIN. Warning Signs of HEART ATTACK     Call 911 if you have these symptoms:   Chest discomfort. Most heart attacks involve discomfort in the center of the chest that lasts more than a few minutes, or that goes away and comes back. It can feel like uncomfortable pressure, squeezing, fullness, or pain.  Discomfort in other areas of the upper body. Symptoms can include pain or discomfort in one or both arms, the back, neck, jaw, or stomach.  Shortness of breath with or without chest discomfort.  Other signs may include breaking out in a cold sweat, nausea, or lightheadedness. Don't wait more than five minutes to call 911 - MINUTES MATTER! Fast action can save your life. Calling 911 is almost always the fastest way to get lifesaving treatment. Emergency Medical Services staff can begin treatment when they arrive -- up to an hour sooner than if someone gets to the hospital by car. The discharge information has been reviewed with the patient. The patient verbalized understanding. Discharge medications reviewed with the patient and appropriate educational materials and side effects teaching were provided. Patient armband removed and given to patient to take home.   Patient was informed of the privacy risks if armband lost or stolen

## 2017-09-26 NOTE — PROCEDURES
Colonoscopy Report    Patient: Hunter Cowden MRN: [id number]  SSN: [id number]    YOB: 1955  Age: 65 y.o. Sex: female      Date of Procedure: 9/26/2017    IMPRESSION:  1. Anal papillomas status post biopsied  2. No polyps or tumors  3. Internal hemorrhoids    RECOMMENDATIONS:  1. Resume regular diet, Recommend high fiber. 2. Referral to colorectal surgery    Indication:  Screening colonoscopy  Procedure Performed: Colonoscopy biopsy  Endoscopist: Michelle Rowell MD  Assistant: Endoscopy Technician-1: Lona Lin  Endoscopy RN-1: Rosa Domínguez RN  ASA: ASA 3 - Patient with moderate systemic disease with functional limitations  Mallampati Score: I (soft palate, uvula, fauces, tonsillar pillars visible)  Anesthesia: MAC anesthesia Propofol  Endoscope:     [x]  CF H 190AL   []  PCF H190AL   []  GIF H 190    Extent of Examination:cecum, which was identified by the ileocecal valve and appendiceal orifice  Quality of Preparation:     []  Excellent   [x]  Very Good   [x] Fair but adequate   [] Fair, inadequate   []  Poor      Technique: The procedure was discussed with the patient including risks, benefits, alternatives including risks of IV sedation, bleeding, perforation and missed polyp. A safety timeout was performed. The patient was given incremental doses of intravenous sedation to achieve moderate sedation. The patients vital signs were monitored at all times including heart rate, rhythm, blood pressure and oxygen saturation. The patient was placed in left lateral position. When adequate sedation was achieved a perianal inspection and a digital rectal exam were performed. Video colonoscope was introduced into the rectum and advanced under direct vision up to the cecum, which was identified by the ileocecal valve and appendiceal orifice. The cecum was identified by IC valve, appendiceal orifice and crows foot.  With adequate insufflation and maneuvering of the withdrawing scope, the colonic mucosa was visualized carefully. Retroflexion was performed in the rectum and the distal rectum visualized. The patient tolerated the procedure very well and was transferred to recovery area. Findings:  1. Anal papillomas status post biopsied  2. No polyps or tumors  3.  Internal hemorrhoids      EBL:Minimal  Specimen: * No specimens in log *    Yesi Reynolds MD  September 26, 2017  10:51 AM

## 2017-09-26 NOTE — ANESTHESIA POSTPROCEDURE EVALUATION
Post-Anesthesia Evaluation and Assessment    Patient: Troy Moore MRN: [id number]  SSN: [id number]    YOB: 1955  Age: 65 y.o. Sex: female      Data from PACU flowsheet    Cardiovascular Function/Vital Signs  Visit Vitals    /85 (BP 1 Location: Left arm, BP Patient Position: At rest)    Pulse 67    Temp 36.2 °C (97.1 °F)    Resp 16    Ht 5' 7\" (1.702 m)    Wt 90.7 kg (200 lb)    SpO2 98%    BMI 31.32 kg/m2       Patient is status post anesthesia    Nausea/Vomiting: controlled    Postoperative hydration reviewed and adequate. Pain:  Managed    Neurological Status: At baseline    Mental Status and Level of Consciousness: Alert and oriented     Pulmonary Status:   Adequate oxygenation and airway patent    Complications related to anesthesia: None    Post-anesthesia assessment completed.  No concerns    Signed By: Anna Humphrey CRNA     September 26, 2017

## 2017-09-26 NOTE — ANESTHESIA PREPROCEDURE EVALUATION
Anesthetic History   No history of anesthetic complications            Review of Systems / Medical History  Patient summary reviewed and pertinent labs reviewed    Pulmonary                   Neuro/Psych         Psychiatric history     Cardiovascular    Hypertension                Comments: HIV   GI/Hepatic/Renal           Liver disease     Endo/Other      Hypothyroidism  Arthritis     Other Findings   Comments:   Risk Factors for Postoperative nausea/vomiting:       History of postoperative nausea/vomiting? NO       Female? YES       Motion sickness? NO       Intended opioid administration for postoperative analgesia? NO      Smoking Abstinence  Current Smoker? NO  Elective Surgery? YES  Seen preoperatively by anesthesiologist or proxy prior to day of surgery? YES  Pt abstained from smoking 24 hours prior to anesthesia?  N/A           Physical Exam    Airway  Mallampati: II  TM Distance: 4 - 6 cm  Neck ROM: normal range of motion   Mouth opening: Normal     Cardiovascular    Rhythm: regular  Rate: normal         Dental    Dentition: Edentulous and Poor dentition     Pulmonary  Breath sounds clear to auscultation               Abdominal  GI exam deferred       Other Findings            Anesthetic Plan    ASA: 3  Anesthesia type: MAC            Anesthetic plan and risks discussed with: Patient

## 2017-10-30 ENCOUNTER — OFFICE VISIT (OUTPATIENT)
Dept: SURGERY | Age: 62
End: 2017-10-30

## 2017-10-30 VITALS
DIASTOLIC BLOOD PRESSURE: 88 MMHG | RESPIRATION RATE: 20 BRPM | WEIGHT: 200 LBS | HEIGHT: 67 IN | HEART RATE: 61 BPM | SYSTOLIC BLOOD PRESSURE: 136 MMHG | OXYGEN SATURATION: 98 % | TEMPERATURE: 96.1 F | BODY MASS INDEX: 31.39 KG/M2

## 2017-10-30 DIAGNOSIS — A63.0 ANAL CONDYLOMATA: Primary | ICD-10-CM

## 2017-10-30 NOTE — MR AVS SNAPSHOT
Visit Information Date & Time Provider Department Dept. Phone Encounter #  
 10/30/2017  2:00 PM MD Kirsten Palomo [facility] [phone] [id number] Your Appointments 11/21/2017  3:30 PM  
POST OP with Dante Trevino MD  
[address] [facility]) Appt Note: post op/ EUA of anal condyloma 11-08-17. ..[facility]  
 [address] [address] [facility] [address]  
  
   
 [address] [address] Upcoming Health Maintenance Date Due Hepatitis C Screening 1955 BREAST CANCER SCRN MAMMOGRAM 5/6/2006 FOBT Q 1 YEAR AGE 50-75 5/6/2006 PAP AKA CERVICAL CYTOLOGY 2/14/2015 ZOSTER VACCINE AGE 60> 3/6/2016 INFLUENZA AGE 9 TO ADULT 8/1/2017 DTaP/Tdap/Td series (2 - Td) 10/31/2022 Allergies as of 10/30/2017  Review Complete On: 10/30/2017 By: Dante Trevino MD  
 No Known Allergies Current Immunizations  Never Reviewed Name Date TDAP Vaccine 10/31/2012 10:36 AM  
  
 Not reviewed this visit You Were Diagnosed With   
  
 Codes Comments Anal condylomata    -  Primary ICD-10-CM: A63.0 ICD-9-CM: 078.11 Vitals BP Pulse Temp Resp Height(growth percentile) Weight(growth percentile) 136/88 61 96.1 °F (35.6 °C) (Oral) 20 5' 7\" (1.702 m) 200 lb (90.7 kg) SpO2 BMI OB Status Smoking Status 98% 31.32 kg/m2 Hysterectomy Never Smoker BMI and BSA Data Body Mass Index Body Surface Area  
 31.32 kg/m 2 2.07 m 2 Preferred Pharmacy Pharmacy Name Phone [address] [address] [phone] Your Updated Medication List  
  
   
This list is accurate as of: 10/30/17  3:39 PM.  Always use your most recent med list.  
  
  
  
  
 amitriptyline 25 mg tablet Commonly known as:  ELAVIL  
take 1 tablet by mouth at bedtime  
  
 amLODIPine 10 mg tablet Commonly known as:  Elyn Coffer Take 10 mg by mouth daily. DESCOVY tablet Generic drug:  emtricitabine-tenofovir alafen  
  
 hydroCHLOROthiazide 12.5 mg capsule Commonly known as:  MICROZIDE  
25 mg.  
  
 ibuprofen 600 mg tablet Commonly known as:  MOTRIN Take 1 Tab by mouth every six (6) hours as needed for Pain.  
  
 levothyroxine 100 mcg tablet Commonly known as:  SYNTHROID Take 100 mcg by mouth Daily (before breakfast). lisinopril 10 mg tablet Commonly known as:  PRINIVIL, ZESTRIL  
take 1 tablet by mouth once daily NORVIR 100 mg tablet Generic drug:  ritonavir Norvir 100 MG Oral Tablet TAKE 1 TABLET DAILY  Quantity: 30;  Refills: 5    Butch Mano P.A.;  Started 25-Aug-2010 Active REYATAZ 300 mg capsule Generic drug:  atazanavir Take 300 mg by mouth daily. TRUVADA 200-300 mg per tablet Generic drug:  emtricitabine-tenofovir (TDF) Take 1 Tab by mouth daily. We Performed the Following NE DIAGNOSTIC ANOSCOPY [id number] CPT(R)] Patient Instructions If you have any questions or concerns about today's appointment, the verbal and/or written instructions you were given for follow up care, please call our office at [phone]. [facility] - [address], [facility] [address] 
 
[phone] office [phone] fax [facility]  [address], [address] [facility] [address] 
 
[phone] office main line [phone] main fax PATIENT PRE AND POST OPERATIVE INSTRUCTIONS Hospital: [facility] [address] [place] [address] Road 
[phone] Before Surgery Instructions:  
1) You must have someone available to drive you to and from your procedure and stay with you for the first 24 hours.  
2) It is very important that you have nothing to eat or drink after midnight the night before your surgery. This includes chewing gum or sucking on hard candy. Take only heart, blood pressure and cholesterol medications the morning of surgery with only a sip of water. 3) Please stop taking Plavix 10 days prior to your surgery. Stop taking Coumadin 5 days prior to your surgery. Stop taking all Aspirin or Aspirin containing products 7 days prior to your surgery. Stop taking Advil, Motrin, Aleve, and etc. 3 days prior to your surgery. 4) If you take any diabetic medications please consult with your primary care physician on how to take them on the day of your surgery 5) Please stop all Herbal products 2 weeks prior to your surgery. 6) Please arrive at the hospital 2 hours prior to your surgery, unless you have been otherwise instructed. 7) Patients having an operation on their colon will be given a separate instruction sheet on their Bowel Prep. 8) For any pre-operative work up check in at the main entrance to [address] [place] [address], and then go to Patient Registration. These studies are done on a walk in basis they are open from 7:00am to 5:00pm Monday through Friday. 9) Please wash your surgical site the morning of your surgery with soap and water. 10) If you are of child bearing age you will have pregnancy test done the morning of your surgery as soon as you arrive. Surgery Date and Time:       November 08, 2017                at   07:30  am     
 
Please check in at [facility], enter through the Emergency Room entrance and go up to the second floor. Please check in by  05:30 am  the day of your surgery. You may contact Yann Aguilar with any questions at [phone]. After Surgery Instructions: You will need to be seen in the office for a follow-up visit 7-14 days after your surgery. Please call after you have had the procedure to make this appointment.  
Unless otherwise instructed, you may remove your outer bandage and shower 48 hours after your surgery. If you develop a fever greater than 101, have any significant drainage, bleeding, swelling and/or pus of the wound. Please call our office immediately. Introducing [facility]! Caroline Real introduces joiz patient portal. Now you can access parts of your medical record, email your doctor's office, and request medication refills online. 1. In your internet browser, go to [email or web address] 2. Click on the First Time User? Click Here link in the Sign In box. You will see the New Member Sign Up page. 3. Enter your joiz Access Code exactly as it appears below. You will not need to use this code after youve completed the sign-up process. If you do not sign up before the expiration date, you must request a new code. · joiz Access Code: [email or web address] Expires: 12/25/2017  8:51 AM 
 
4. Enter the last four digits of your Social Security Number (xxxx) and Date of Birth (mm/dd/yyyy) as indicated and click Submit. You will be taken to the next sign-up page. 5. Create a joiz ID. This will be your joiz login ID and cannot be changed, so think of one that is secure and easy to remember. 6. Create a joiz password. You can change your password at any time. 7. Enter your Password Reset Question and Answer. This can be used at a later time if you forget your password. 8. Enter your e-mail address. You will receive e-mail notification when new information is available in [address] 9. Click Sign Up. You can now view and download portions of your medical record. 10. Click the Download Summary menu link to download a portable copy of your medical information. If you have questions, please visit the Frequently Asked Questions section of the joiz website. Remember, joiz is NOT to be used for urgent needs. For medical emergencies, dial 911. Now available from your iPhone and Android! Please provide this summary of care documentation to your next provider. Your primary care clinician is listed as Nik Snow. If you have any questions after today's visit, please call [phone].

## 2017-10-30 NOTE — PROGRESS NOTES
[facility]  Colon and Rectal Surgery  [address] [address]              Colon and Rectal Surgery        Patient: Candi Olsen  MRN: [id number]  Date: 10/30/2017     Age:  65 y.o.,      Sex: female    YOB: 1955      Subjective    Ms. Judy Ramirez is an 65 y.o. female referred by Dr. Alonso Bonilla. She underwent a screening colonoscopy exam on 9/26/2017. This showed:    1. Anal papillomas status post biopsies  2. No polyps or tumors  3. Internal hemorrhoids    The final Pathology report revealed: FINAL DIAGNOSIS:   ANUS, BIOPSY: SQUAMOUS PAPILLOMA WITH MILD ACUTE INFLAMMATION. GROSS DESCRIPTION:   The specimen is received in formalin in a container labeled with the patient's name, Candi Olsen, and biopsy anus HPV and consists of one pale tan soft tissue fragment measuring 0.1 x 0.1 x 0.1 cm submitted in toto in cassette A1. MICROSCOPIC DESCRIPTION:   Sections of the tissue show an inflamed papillary epithelial proliferation. We do not see a prominent granular layer or definitive koilocytes. The features are most consistent with those of a squamous papilloma. Dysplastic changes are not observed. Otherwise the patient denies any rectal bleeding, change in bowel habits, weight changes, nor any abdominal pain. Patient also denies vomiting, diarrhea, bloody stools, mucousy stools, reflux and nausea. Bowel habits are reported as occasionally constipated. The family history is negative for colon cancer/polyps, other GI malignancies, nor inflammatory bowel diseases.         Past Medical History:   Diagnosis Date    Arthritis     Right leg    Hep C w/ coma, chronic (HCC)     HIV (human immunodeficiency virus infection) ([facility] [address]     HIV disease (HCC)     Hypertension     Infectious disease     HIV, hepatitis C    Insomnia     Liver disease     Heatitis C    Psychiatric disorder     depression, anxiety    Thyroid disease     had thyroidectomy Past Surgical History:   Procedure Laterality Date    COLONOSCOPY N/A 9/26/2017    COLONOSCOPY performed by Francesca Avelar MD at [address] [place] [address] HX COLONOSCOPY      HX HYSTERECTOMY         No Known Allergies    Prior to Admission medications    Medication Sig Start Date End Date Taking? Authorizing Provider   DESCOVY tablet  2/23/17  Yes Historical Provider   lisinopril (PRINIVIL, ZESTRIL) 10 mg tablet take 1 tablet by mouth once daily 2/14/17  Yes Historical Provider   hydroCHLOROthiazide (MICROZIDE) 12.5 mg capsule 25 mg. 10/1/15  Yes Historical Provider   amLODIPine (NORVASC) 10 mg tablet Take 10 mg by mouth daily. 12/28/16  Yes Historical Provider   ibuprofen (MOTRIN) 600 mg tablet Take 1 Tab by mouth every six (6) hours as needed for Pain. 12/27/16  Yes Audrey Vogt MD   amitriptyline (ELAVIL) 25 mg tablet take 1 tablet by mouth at bedtime 9/28/16  Yes Historical Provider   ritonavir (NORVIR) 100 mg tablet Norvir 100 MG Oral Tablet  TAKE 1 TABLET DAILY   Quantity: 30;  Refills: CAILIN AL;  Started 25-Aug-2010  Active 8/25/10  Yes Historical Provider   atazanavir (REYATAZ) 300 mg capsule Take 300 mg by mouth daily. Yes Vidya Chery MD   emtricitabine-tenofovir (TRUVADA) 200-300 mg per tablet Take 1 Tab by mouth daily. Yes Vidya Chery MD   levothyroxine (SYNTHROID) 100 mcg tablet Take 100 mcg by mouth Daily (before breakfast). Yes Vidya Chery MD       Current Outpatient Prescriptions   Medication Sig Dispense Refill    DESCOVY tablet   0    lisinopril (PRINIVIL, ZESTRIL) 10 mg tablet take 1 tablet by mouth once daily  0    hydroCHLOROthiazide (MICROZIDE) 12.5 mg capsule 25 mg.      amLODIPine (NORVASC) 10 mg tablet Take 10 mg by mouth daily. 0    ibuprofen (MOTRIN) 600 mg tablet Take 1 Tab by mouth every six (6) hours as needed for Pain.  180 Tab 2    amitriptyline (ELAVIL) 25 mg tablet take 1 tablet by mouth at bedtime  0    ritonavir (NORVIR) 100 mg tablet Norvir 100 MG Oral Tablet  TAKE 1 TABLET DAILY   Quantity: 30;  Refills: 5       DIDICAILIN;  Started 25-Aug-2010  Active      atazanavir (REYATAZ) 300 mg capsule Take 300 mg by mouth daily.  emtricitabine-tenofovir (TRUVADA) 200-300 mg per tablet Take 1 Tab by mouth daily.  levothyroxine (SYNTHROID) 100 mcg tablet Take 100 mcg by mouth Daily (before breakfast). Social History     Social History    Marital status:      Spouse name: N/A    Number of children: N/A    Years of education: N/A     Occupational History    Not on file. Social History Main Topics    Smoking status: Never Smoker    Smokeless tobacco: Never Used    Alcohol use No      Comment: occasionally    Drug use: Yes     Special: Cocaine, Marijuana      Comment: hx of cocaine use in july, last use of marijuana in august    Sexual activity: Not on file     Other Topics Concern    Not on file     Social History Narrative       Family History   Problem Relation Age of Onset    Family history unknown: Yes           Review of Systems:    A comprehensive review of systems was negative except for: Musculoskeletal: positive for arthralgias and stiff joints  Behvioral/Psych: positive for anxiety and depression    Objective:        Visit Vitals    /88    Pulse 61    Temp 96.1 °F (35.6 °C) (Oral)    Resp 20    Ht 5' 7\" (1.702 m)    Wt 90.7 kg (200 lb)    SpO2 98%    BMI 31.32 kg/m2       Physical Exam:   GENERAL: alert, cooperative, no distress, appears stated age  LUNG: clear to auscultation bilaterally  HEART: regular rate and rhythm, S1, S2 normal, no murmur, click, rub or gallop  EXTREMITIES:  extremities normal, atraumatic, no cyanosis or edema     Anorectal:  With the patient in the prone position the anus appeared abnormal with findings of moderate prolapsed external and internal hemorrhoid disease in all three quadrants.   There were scattered small (1-2 mm) condyloma lesions posteriorly in the perianal region. Digital rectal examination revealed Normal sphincter tone and squeeze pressure. Palpation revealed No Masses. Anoscopy revealed additional findings of scattered condyloma lesions (1 mm) circumferentially in the anal canal near the dentate line. Assessment / Plan    Ms. Chely Yoder is an 65 y.o. female with perianal and anal canal condyloma lesions. In addition, there are moderate prolapsed external and internal hemorrhoid disease, which the patient claims are relatively asymptomatic. I discussed the need for an expedient exam under anesthesia with excision and fulguration of hte condyloma lesions. The patient was in full agreement. I discussed the details of the procedure as well as the risks of surgery including bleeding, infection, pain, anesthesia complications, possibility of recurrent disease, and the possibility of anal incontinence with any anal surgery. The patient is willing to accept the risks and would like to proceed with the surgery. Thank you for allowing me to participate in the patient's care.           Navdeep Cordero MD, FACS, FASCRS  Colon and Rectal Surgery  [facility]  Office [phone]  Fax     [phone]  10/30/2017  3:20 PM

## 2017-10-30 NOTE — PATIENT INSTRUCTIONS
If you have any questions or concerns about today's appointment, the verbal and/or written instructions you were given for follow up care, please call our office at [phone]. [facility] - [address], [address] [place] [address] Road    [phone] office  [phone] fax    . [facility] - [address]    [phone] office main line  [phone] main fax                  PATIENT PRE AND POST [address]: [facility] [address]  [phone]    Before Surgery Instructions:   1) You must have someone available to drive you to and from your procedure and stay with you for the first 24 hours. 2) It is very important that you have nothing to eat or drink after midnight the night before your surgery. This includes chewing gum or sucking on hard candy. Take only heart, blood pressure and cholesterol medications the morning of surgery with only a sip of water. 3) Please stop taking Plavix 10 days prior to your surgery. Stop taking Coumadin 5 days prior to your surgery. Stop taking all Aspirin or Aspirin containing products 7 days prior to your surgery. Stop taking Advil, Motrin, Aleve, and etc. 3 days prior to your surgery. 4) If you take any diabetic medications please consult with your primary care physician on how to take them on the day of your surgery  5) Please stop all Herbal products 2 weeks prior to your surgery. 6) Please arrive at the hospital 2 hours prior to your surgery, unless you have been otherwise instructed. 7) Patients having an operation on their colon will be given a separate instruction sheet on their Bowel Prep. 8) For any pre-operative work up check in at the main entrance to [facility], and then go to Patient Registration.  These studies are done on a walk in basis they are open from 7:00am to 5:00pm Monday through Friday. 9) Please wash your surgical site the morning of your surgery with soap and water. 10) If you are of child bearing age you will have pregnancy test done the morning of your surgery as soon as you arrive. Surgery Date and Time:       November 08, 2017                at   07:30  am        Please check in at [facility], enter through the Emergency Room entrance and go up to the second floor. Please check in by  05:30 am  the day of your surgery. You may contact Bartolo Borrego with any questions at [phone]. After Surgery Instructions: You will need to be seen in the office for a follow-up visit 7-14 days after your surgery. Please call after you have had the procedure to make this appointment. Unless otherwise instructed, you may remove your outer bandage and shower 48 hours after your surgery. If you develop a fever greater than 101, have any significant drainage, bleeding, swelling and/or pus of the wound. Please call our office immediately.

## 2017-11-07 ENCOUNTER — TELEPHONE (OUTPATIENT)
Dept: SURGERY | Age: 62
End: 2017-11-07

## 2017-11-07 NOTE — TELEPHONE ENCOUNTER
Ms. Steffi Keller did call and reschedule her appt. Patient did not elaborate the reason for rescheduling. Dr Michael Hutson made aware. Patient's new appt. date is now December 01 @ 07:30 am

## 2017-11-28 ENCOUNTER — TELEPHONE (OUTPATIENT)
Dept: SURGERY | Age: 62
End: 2017-11-28

## 2017-11-28 NOTE — TELEPHONE ENCOUNTER
MsTahmina Tiffanie Ximena called this morning to reschedule her surgery. She said she has to give 5 days notice to her ride which she forgot to do last week. We did reschedule her surgery from Dec. 01 to Dec. 08.

## 2017-11-28 NOTE — TELEPHONE ENCOUNTER
Ms. Cutler Ok called back after lunch stating she wants her old appt date and time back since her daughter will be home from sea duty today. Her surgery is now scheduled for Dec. 01 @ 7:30 Am as originally planned.

## 2017-11-29 RX ORDER — ALBUTEROL SULFATE 90 UG/1
2 AEROSOL, METERED RESPIRATORY (INHALATION)
COMMUNITY
Start: 2016-07-10

## 2017-11-30 ENCOUNTER — ANESTHESIA EVENT (OUTPATIENT)
Dept: SURGERY | Age: 62
End: 2017-11-30
Payer: MEDICAID

## 2017-12-01 ENCOUNTER — HOSPITAL ENCOUNTER (OUTPATIENT)
Age: 62
Setting detail: OUTPATIENT SURGERY
Discharge: HOME | End: 2017-12-01
Attending: COLON & RECTAL SURGERY | Admitting: COLON & RECTAL SURGERY
Payer: MEDICAID

## 2017-12-01 ENCOUNTER — ANESTHESIA (OUTPATIENT)
Dept: SURGERY | Age: 62
End: 2017-12-01
Payer: MEDICAID

## 2017-12-01 VITALS
WEIGHT: 197.25 LBS | OXYGEN SATURATION: 96 % | HEIGHT: 66 IN | HEART RATE: 89 BPM | TEMPERATURE: 97.1 F | SYSTOLIC BLOOD PRESSURE: 138 MMHG | DIASTOLIC BLOOD PRESSURE: 82 MMHG | BODY MASS INDEX: 31.7 KG/M2 | RESPIRATION RATE: 16 BRPM

## 2017-12-01 LAB
ATRIAL RATE: 58 BPM
BUN BLD-MCNC: 25 MG/DL (ref 7–18)
CALCULATED P AXIS, ECG09: 13 DEGREES
CALCULATED R AXIS, ECG10: -21 DEGREES
CALCULATED T AXIS, ECG11: 52 DEGREES
CHLORIDE BLD-SCNC: 109 MMOL/L (ref 100–108)
DIAGNOSIS, 93000: NORMAL
GLUCOSE BLD STRIP.AUTO-MCNC: 85 MG/DL (ref 74–106)
HCT VFR BLD CALC: 39 % (ref 36–49)
HGB BLD-MCNC: 13.3 G/DL (ref 12–16)
P-R INTERVAL, ECG05: 132 MS
POTASSIUM BLD-SCNC: 5.9 MMOL/L (ref 3.5–5.5)
Q-T INTERVAL, ECG07: 442 MS
QRS DURATION, ECG06: 88 MS
QTC CALCULATION (BEZET), ECG08: 433 MS
SODIUM BLD-SCNC: 142 MMOL/L (ref 136–145)
VENTRICULAR RATE, ECG03: 58 BPM

## 2017-12-01 PROCEDURE — 76210000020 HC REC RM PH II FIRST 0.5 HR: Performed by: COLON & RECTAL SURGERY

## 2017-12-01 PROCEDURE — 74011250636 HC RX REV CODE- 250/636

## 2017-12-01 PROCEDURE — 74011000272 HC RX REV CODE- 272: Performed by: COLON & RECTAL SURGERY

## 2017-12-01 PROCEDURE — 88305 TISSUE EXAM BY PATHOLOGIST: CPT | Performed by: COLON & RECTAL SURGERY

## 2017-12-01 PROCEDURE — 74011250637 HC RX REV CODE- 250/637: Performed by: NURSE ANESTHETIST, CERTIFIED REGISTERED

## 2017-12-01 PROCEDURE — 77030032490 HC SLV COMPR SCD KNE COVD -B: Performed by: COLON & RECTAL SURGERY

## 2017-12-01 PROCEDURE — 74011000250 HC RX REV CODE- 250

## 2017-12-01 PROCEDURE — 82947 ASSAY GLUCOSE BLOOD QUANT: CPT

## 2017-12-01 PROCEDURE — 76010000138 HC OR TIME 0.5 TO 1 HR: Performed by: COLON & RECTAL SURGERY

## 2017-12-01 PROCEDURE — 77030011640 HC PAD GRND REM COVD -A: Performed by: COLON & RECTAL SURGERY

## 2017-12-01 PROCEDURE — 76060000032 HC ANESTHESIA 0.5 TO 1 HR: Performed by: COLON & RECTAL SURGERY

## 2017-12-01 PROCEDURE — 93005 ELECTROCARDIOGRAM TRACING: CPT

## 2017-12-01 PROCEDURE — 77030011265 HC ELECTRD BLD HEX COVD -A: Performed by: COLON & RECTAL SURGERY

## 2017-12-01 PROCEDURE — 74011000250 HC RX REV CODE- 250: Performed by: COLON & RECTAL SURGERY

## 2017-12-01 PROCEDURE — 74011250636 HC RX REV CODE- 250/636: Performed by: NURSE ANESTHETIST, CERTIFIED REGISTERED

## 2017-12-01 PROCEDURE — 77030008552 HC TBNG SMK EVAC BFLF -A: Performed by: COLON & RECTAL SURGERY

## 2017-12-01 PROCEDURE — 77030018842 HC SOL IRR SOD CL 9% BAXT -A: Performed by: COLON & RECTAL SURGERY

## 2017-12-01 RX ORDER — OXYCODONE AND ACETAMINOPHEN 5; 325 MG/1; MG/1
1 TABLET ORAL
Qty: 25 TAB | Refills: 0 | Status: SHIPPED | OUTPATIENT
Start: 2017-12-01

## 2017-12-01 RX ORDER — FAMOTIDINE 20 MG/1
20 TABLET, FILM COATED ORAL ONCE
Status: COMPLETED | OUTPATIENT
Start: 2017-12-02 | End: 2017-12-01

## 2017-12-01 RX ORDER — LIDOCAINE HYDROCHLORIDE 20 MG/ML
INJECTION, SOLUTION EPIDURAL; INFILTRATION; INTRACAUDAL; PERINEURAL AS NEEDED
Status: DISCONTINUED | OUTPATIENT
Start: 2017-12-01 | End: 2017-12-01 | Stop reason: HOSPADM

## 2017-12-01 RX ORDER — SODIUM CHLORIDE 0.9 % (FLUSH) 0.9 %
5-10 SYRINGE (ML) INJECTION EVERY 8 HOURS
Status: DISCONTINUED | OUTPATIENT
Start: 2017-12-01 | End: 2017-12-01 | Stop reason: HOSPADM

## 2017-12-01 RX ORDER — POVIDONE-IODINE 10 %
SOLUTION, NON-ORAL TOPICAL AS NEEDED
Status: DISCONTINUED | OUTPATIENT
Start: 2017-12-01 | End: 2017-12-01 | Stop reason: HOSPADM

## 2017-12-01 RX ORDER — FAMOTIDINE 20 MG/1
TABLET, FILM COATED ORAL
Status: DISCONTINUED
Start: 2017-12-01 | End: 2017-12-01 | Stop reason: HOSPADM

## 2017-12-01 RX ORDER — SODIUM CHLORIDE, SODIUM LACTATE, POTASSIUM CHLORIDE, CALCIUM CHLORIDE 600; 310; 30; 20 MG/100ML; MG/100ML; MG/100ML; MG/100ML
50 INJECTION, SOLUTION INTRAVENOUS CONTINUOUS
Status: CANCELLED | OUTPATIENT
Start: 2017-12-01

## 2017-12-01 RX ORDER — SODIUM CHLORIDE, SODIUM LACTATE, POTASSIUM CHLORIDE, CALCIUM CHLORIDE 600; 310; 30; 20 MG/100ML; MG/100ML; MG/100ML; MG/100ML
50 INJECTION, SOLUTION INTRAVENOUS CONTINUOUS
Status: DISCONTINUED | OUTPATIENT
Start: 2017-12-02 | End: 2017-12-01 | Stop reason: HOSPADM

## 2017-12-01 RX ORDER — HYDROCODONE BITARTRATE AND ACETAMINOPHEN 5; 325 MG/1; MG/1
1 TABLET ORAL AS NEEDED
Status: CANCELLED | OUTPATIENT
Start: 2017-12-01

## 2017-12-01 RX ORDER — PROPOFOL 10 MG/ML
INJECTION, EMULSION INTRAVENOUS
Status: DISCONTINUED | OUTPATIENT
Start: 2017-12-01 | End: 2017-12-01 | Stop reason: HOSPADM

## 2017-12-01 RX ORDER — PROPOFOL 10 MG/ML
INJECTION, EMULSION INTRAVENOUS AS NEEDED
Status: DISCONTINUED | OUTPATIENT
Start: 2017-12-01 | End: 2017-12-01 | Stop reason: HOSPADM

## 2017-12-01 RX ORDER — MIDAZOLAM HYDROCHLORIDE 1 MG/ML
INJECTION, SOLUTION INTRAMUSCULAR; INTRAVENOUS AS NEEDED
Status: DISCONTINUED | OUTPATIENT
Start: 2017-12-01 | End: 2017-12-01 | Stop reason: HOSPADM

## 2017-12-01 RX ORDER — ONDANSETRON 2 MG/ML
4 INJECTION INTRAMUSCULAR; INTRAVENOUS
Status: CANCELLED | OUTPATIENT
Start: 2017-12-01

## 2017-12-01 RX ORDER — BACITRACIN 500 [USP'U]/G
OINTMENT TOPICAL AS NEEDED
Status: DISCONTINUED | OUTPATIENT
Start: 2017-12-01 | End: 2017-12-01 | Stop reason: HOSPADM

## 2017-12-01 RX ORDER — FENTANYL CITRATE 50 UG/ML
25 INJECTION, SOLUTION INTRAMUSCULAR; INTRAVENOUS AS NEEDED
Status: CANCELLED | OUTPATIENT
Start: 2017-12-01

## 2017-12-01 RX ORDER — HYDROMORPHONE HYDROCHLORIDE 2 MG/ML
0.5 INJECTION, SOLUTION INTRAMUSCULAR; INTRAVENOUS; SUBCUTANEOUS
Status: CANCELLED | OUTPATIENT
Start: 2017-12-01

## 2017-12-01 RX ORDER — SODIUM CHLORIDE 0.9 % (FLUSH) 0.9 %
5-10 SYRINGE (ML) INJECTION AS NEEDED
Status: DISCONTINUED | OUTPATIENT
Start: 2017-12-01 | End: 2017-12-01 | Stop reason: HOSPADM

## 2017-12-01 RX ADMIN — MIDAZOLAM HYDROCHLORIDE 2 MG: 1 INJECTION, SOLUTION INTRAMUSCULAR; INTRAVENOUS at 07:51

## 2017-12-01 RX ADMIN — LIDOCAINE HYDROCHLORIDE 60 MG: 20 INJECTION, SOLUTION EPIDURAL; INFILTRATION; INTRACAUDAL; PERINEURAL at 07:58

## 2017-12-01 RX ADMIN — PROPOFOL 100 MCG/KG/MIN: 10 INJECTION, EMULSION INTRAVENOUS at 08:00

## 2017-12-01 RX ADMIN — FAMOTIDINE 20 MG: 20 TABLET, FILM COATED ORAL at 07:15

## 2017-12-01 RX ADMIN — PROPOFOL 30 MG: 10 INJECTION, EMULSION INTRAVENOUS at 07:58

## 2017-12-01 RX ADMIN — SODIUM CHLORIDE, SODIUM LACTATE, POTASSIUM CHLORIDE, AND CALCIUM CHLORIDE 50 ML/HR: 600; 310; 30; 20 INJECTION, SOLUTION INTRAVENOUS at 07:15

## 2017-12-01 NOTE — DISCHARGE INSTRUCTIONS
[facility]  [address] [place] [address], [address] [place] [address]            Anal and Rectal Surgery Discharge Instructions    These instructions will cover the most common concerns following surgery on the anal area. If you have further questions or problems, please contact our office. The office is open Monday - Friday 8:30 AM to 4:30 pm. If emergencies arise after business hours, the answering service can contact the on-call physician. The number for the office and answering service is [phone]. Recovery from Anesthesia/Sedation  · Do not engage in any activity that requires physical/mental coordination, as the medications may cause drowsiness and dizziness. · Do not drive or operate heavy machinery for 24 hours. · Do not consume alcohol, tranquilizers or sleeping medications for 24 hours. · You must have someone home with you today. Activity  · You are advised to go directly home. Restrict your activities and rest for a day. · Resume light to normal activity tomorrow unless instructed differently. · Try to avoid sitting for prolonged periods with pressure on the surgical site. Reclining or sitting to one side is better. It may take several weeks to return to normal activity. Fluids and Diet  · Begin with a light diet (soup, toast, crackers). · Unless instructed differently, you may advance to regular food. · Avoid heavy, greasy and spicy foods. · Drink plenty of fluids daily. Follow-Up  Unless you already have an appointment, call the office ([phone]) when you get home to make an appointment to see your surgeon approximately 2 weeks after discharge from the hospital. Also call the office for:  · Fever greater than 101.5 F  · Inability to urinate for more than 8 hours  · Warmth, redness, or foul-smelling drainage from around the incision  · Sudden loss of bright red blood from surgical site or rectum (greater than 1/2 cup).     Pain  Discomfort is common after surgery in this area. Soaking in a Sitz bath or tub of warn water may help with pain. · A narcotic pain medication has been prescribed by your surgeon. Take as directed. · Use your over-the-counter pain medication such as Ibuprofen when you have finished the prescription provided by your surgeon or if you feel that the pain can be managed with these medications. Bowel Regimen  Many people find it helps to take fiber supplementation and a stool softener to regulate the bowels after surgery, especially if narcotic pain medication is being used. Colace or Miralax are options. You can take Milk of Magnesia one to two tablespoons every 4-6 hours as needed for more severe constipation. Wound Care  It is common to have some clear or light yellow or pink-tinged drainage from the incision. Thick, foul-smelling drainage can be a sign of infection. Call the office if this occurs. It is also common to have small amounts of bleeding with bowel movements. Keep the surgical area clean by washing in the shower, tub or sitz bath after bowel movements. Gauze pads or a menstrual pad cab be used to protect clothing from drainage. It is fine to wash the wound in the shower. Avoid putting soap directly into the incision. · Remove packing and/or dressing  from incision in 24 hours. · It is important to take tub or sitz baths at least 4 times daily soaking at least 15 minutes each time. Try to take these baths especially after bowel movements. · Keep wound or incision covered with antibiotic ointment and dry gauze after each baths. Please contact our office for any concerns or questions. Yonas Miranda MD, FACS, FASCRS  Colon and Rectal Surgery  [facility]  Office [phone]  Fax     [phone]      These instructions have been reviewed with me. I understand the above instructions and have no further questions.     Responsible Party Signature: ______________________________________    Date: December 1, 2017    Nurse's Signature: ______________________________________    Date: December 1, 2017     DISCHARGE SUMMARY from Nurse    PATIENT INSTRUCTIONS:    After general anesthesia or intravenous sedation, for 24 hours or while taking prescription Narcotics:  · Limit your activities  · Do not drive and operate hazardous machinery  · Do not make important personal or business decisions  · Do  not drink alcoholic beverages  · If you have not urinated within 8 hours after discharge, please contact your surgeon on call. Report the following to your surgeon:  · Excessive pain, swelling, redness or odor of or around the surgical area  · Temperature over 100.5  · Nausea and vomiting lasting longer than 4 hours or if unable to take medications  · Any signs of decreased circulation or nerve impairment to extremity: change in color, persistent  numbness, tingling, coldness or increase pain  · Any questions    What to do at Home:  These are general instructions for a healthy lifestyle:    No smoking/ No tobacco products/ Avoid exposure to second hand smoke  Surgeon General's Warning:  Quitting smoking now greatly reduces serious risk to your health. Obesity, smoking, and sedentary lifestyle greatly increases your risk for illness    A healthy diet, regular physical exercise & weight monitoring are important for maintaining a healthy lifestyle    You may be retaining fluid if you have a history of heart failure or if you experience any of the following symptoms:  Weight gain of 3 pounds or more overnight or 5 pounds in a week, increased swelling in our hands or feet or shortness of breath while lying flat in bed. Please call your doctor as soon as you notice any of these symptoms; do not wait until your next office visit.     Recognize signs and symptoms of STROKE:    F-face looks uneven    A-arms unable to move or move unevenly    S-speech slurred or non-existent    T-time-call 911 as soon as signs and symptoms begin-DO NOT go       Back to bed or wait to see if you get better-TIME IS BRAIN. Warning Signs of HEART ATTACK     Call 911 if you have these symptoms:   Chest discomfort. Most heart attacks involve discomfort in the center of the chest that lasts more than a few minutes, or that goes away and comes back. It can feel like uncomfortable pressure, squeezing, fullness, or pain.  Discomfort in other areas of the upper body. Symptoms can include pain or discomfort in one or both arms, the back, neck, jaw, or stomach.  Shortness of breath with or without chest discomfort.  Other signs may include breaking out in a cold sweat, nausea, or lightheadedness. Don't wait more than five minutes to call 911 - MINUTES MATTER! Fast action can save your life. Calling 911 is almost always the fastest way to get lifesaving treatment. Emergency Medical Services staff can begin treatment when they arrive -- up to an hour sooner than if someone gets to the hospital by car. The discharge information has been reviewed with the patient. The patient verbalized understanding. Discharge medications reviewed with the patient and appropriate educational materials and side effects teaching were provided. ___________________________________________________________________________________________________________________________________  Patient armband removed and given to patient to take home.   Patient was informed of the privacy risks if armband lost or stolen

## 2017-12-01 NOTE — IP AVS SNAPSHOT
[address] [place] [address] [address] 
[phone] Patient: Ricky Rivera MRN: [id number] About your hospitalization You were admitted on:  December 1, 2017 You last received care in the:  [facility] PHASE 2 RECOVERY You were discharged on:  December 1, 2017 Why you were hospitalized Your primary diagnosis was:  Not on File Things You Need To Do (next 8 weeks) Follow up with Marlon Harris MD  
  
Phone:  [phone] Where:  [address] [place] [address] , , [address], [address] [address] Schedule an appointment with Santo Lindsay MD as soon as possible for a visit in 2 week(s) Phone:  [phone] Where:  [facility], [address] [address] Monday Dec 11, 2017 POST OP with Santo Lindsay MD at  1:30 PM  
Where: [address] ([facility]) Discharge Orders None A check juanjose indicates which time of day the medication should be taken. My Medications TAKE these medications as instructed Instructions Each Dose to Equal  
 Morning Noon Evening Bedtime  
 albuterol 90 mcg/actuation inhaler Commonly known as:  PROVENTIL HFA, VENTOLIN HFA, PROAIR HFA Your last dose was: Your next dose is: Take 2 Puffs by inhalation every four (4) hours as needed. 2 Puff  
    
   
   
   
  
 amitriptyline 25 mg tablet Commonly known as:  ELAVIL Your last dose was: Your next dose is:    
   
   
 take 1 tablet by mouth at bedtime  
     
   
   
   
  
 amLODIPine 10 mg tablet Commonly known as:  Jacome Grayson Your last dose was: Your next dose is: Take 10 mg by mouth daily. 10 mg DESCOVY tablet Generic drug:  emtricitabine-tenofovir alafen Your last dose was: Your next dose is: hydroCHLOROthiazide 12.5 mg capsule Commonly known as:  Ferdie Cea Your last dose was: Your next dose is:    
   
   
 25 mg.  
 25 mg  
    
   
   
   
  
 levothyroxine 100 mcg tablet Commonly known as:  SYNTHROID Your last dose was: Your next dose is: Take 100 mcg by mouth Daily (before breakfast). 100 mcg  
    
   
   
   
  
 lisinopril 10 mg tablet Commonly known as:  Kimberley Reasons Your last dose was: Your next dose is:    
   
   
 take 1 tablet by mouth once daily NORVIR 100 mg tablet Generic drug:  ritonavir Your last dose was: Your next dose is:    
   
   
 Norvir 100 MG Oral Tablet TAKE 1 TABLET DAILY  Quantity: 30;  Refills: 5    CAILIN TOLBERT;  Started 25-Aug-2010 Active  
     
   
   
   
  
 oxyCODONE-acetaminophen 5-325 mg per tablet Commonly known as:  PERCOCET Your last dose was: Your next dose is: Take 1 Tab by mouth every four (4) hours as needed for Pain. Max Daily Amount: 6 Tabs. 1 Tab REYATAZ 300 mg capsule Generic drug:  atazanavir Your last dose was: Your next dose is: Take 300 mg by mouth daily. 300 mg Where to Get Your Medications Information on where to get these meds will be given to you by the nurse or doctor. ! Ask your nurse or doctor about these medications  
  oxyCODONE-acetaminophen 5-325 mg per tablet Discharge Instructions [facility] [address] [place] [address], [address] [place] [address] Anal and Rectal Surgery Discharge Instructions These instructions will cover the most common concerns following surgery on the anal area. If you have further questions or problems, please contact our office.   The office is open Monday - Friday 8:30 AM to 4:30 pm. If emergencies arise after business hours, the answering service can contact the on-call physician. The number for the office and answering service is [phone]. Recovery from Anesthesia/Sedation · Do not engage in any activity that requires physical/mental coordination, as the medications may cause drowsiness and dizziness. · Do not drive or operate heavy machinery for 24 hours. · Do not consume alcohol, tranquilizers or sleeping medications for 24 hours. · You must have someone home with you today. Activity · You are advised to go directly home. Restrict your activities and rest for a day. · Resume light to normal activity tomorrow unless instructed differently. · Try to avoid sitting for prolonged periods with pressure on the surgical site. Reclining or sitting to one side is better. It may take several weeks to return to normal activity. Fluids and Diet · Begin with a light diet (soup, toast, crackers). · Unless instructed differently, you may advance to regular food. · Avoid heavy, greasy and spicy foods. · Drink plenty of fluids daily. Follow-Up Unless you already have an appointment, call the office ([phone]) when you get home to make an appointment to see your surgeon approximately 2 weeks after discharge from the hospital. Also call the office for: · Fever greater than 101.5 F 
· Inability to urinate for more than 8 hours · Warmth, redness, or foul-smelling drainage from around the incision · Sudden loss of bright red blood from surgical site or rectum (greater than 1/2 cup). Pain Discomfort is common after surgery in this area. Soaking in a Sitz bath or tub of warn water may help with pain. · A narcotic pain medication has been prescribed by your surgeon. Take as directed.  
· Use your over-the-counter pain medication such as Ibuprofen when you have finished the prescription provided by your surgeon or if you feel that the pain can be managed with these medications. Bowel Regimen Many people find it helps to take fiber supplementation and a stool softener to regulate the bowels after surgery, especially if narcotic pain medication is being used. Colace or Miralax are options. You can take Milk of Magnesia one to two tablespoons every 4-6 hours as needed for more severe constipation. Wound Care It is common to have some clear or light yellow or pink-tinged drainage from the incision. Thick, foul-smelling drainage can be a sign of infection. Call the office if this occurs. It is also common to have small amounts of bleeding with bowel movements. Keep the surgical area clean by washing in the shower, tub or sitz bath after bowel movements. Gauze pads or a menstrual pad cab be used to protect clothing from drainage. It is fine to wash the wound in the shower. Avoid putting soap directly into the incision. · Remove packing and/or dressing  from incision in 24 hours. · It is important to take tub or sitz baths at least 4 times daily soaking at least 15 minutes each time. Try to take these baths especially after bowel movements. · Keep wound or incision covered with antibiotic ointment and dry gauze after each baths. Please contact our office for any concerns or questions. Henri Wheat MD, FACS, [facility] Office [phone] Fax     [phone] These instructions have been reviewed with me. I understand the above instructions and have no further questions. Responsible Party Signature: ______________________________________ Date: December 1, 2017 Nurse's Signature: ______________________________________ Date: December 1, 2017 DISCHARGE SUMMARY from Nurse PATIENT INSTRUCTIONS: 
 
After general anesthesia or intravenous sedation, for 24 hours or while taking prescription Narcotics: · Limit your activities · Do not drive and operate hazardous machinery · Do not make important personal or business decisions · Do  not drink alcoholic beverages · If you have not urinated within 8 hours after discharge, please contact your surgeon on call. Report the following to your surgeon: 
· Excessive pain, swelling, redness or odor of or around the surgical area · Temperature over 100.5 · Nausea and vomiting lasting longer than 4 hours or if unable to take medications · Any signs of decreased circulation or nerve impairment to extremity: change in color, persistent  numbness, tingling, coldness or increase pain · Any questions What to do at Home: These are general instructions for a healthy lifestyle: No smoking/ No tobacco products/ Avoid exposure to second hand smoke Surgeon General's Warning:  Quitting smoking now greatly reduces serious risk to your health. Obesity, smoking, and sedentary lifestyle greatly increases your risk for illness A healthy diet, regular physical exercise & weight monitoring are important for maintaining a healthy lifestyle You may be retaining fluid if you have a history of heart failure or if you experience any of the following symptoms:  Weight gain of 3 pounds or more overnight or 5 pounds in a week, increased swelling in our hands or feet or shortness of breath while lying flat in bed. Please call your doctor as soon as you notice any of these symptoms; do not wait until your next office visit. Recognize signs and symptoms of STROKE: 
 
F-face looks uneven A-arms unable to move or move unevenly S-speech slurred or non-existent T-time-call 911 as soon as signs and symptoms begin-DO NOT go Back to bed or wait to see if you get better-TIME IS BRAIN. Warning Signs of HEART ATTACK Call 911 if you have these symptoms: 
? Chest discomfort.  Most heart attacks involve discomfort in the center of the chest that lasts more than a few minutes, or that goes away and comes back. It can feel like uncomfortable pressure, squeezing, fullness, or pain. ? Discomfort in other areas of the upper body. Symptoms can include pain or discomfort in one or both arms, the back, neck, jaw, or stomach. ? Shortness of breath with or without chest discomfort. ? Other signs may include breaking out in a cold sweat, nausea, or lightheadedness. Don't wait more than five minutes to call [address]! Fast action can save your life. Calling 911 is almost always the fastest way to get lifesaving treatment. Emergency Medical Services staff can begin treatment when they arrive  up to an hour sooner than if someone gets to the hospital by car. The discharge information has been reviewed with the patient. The patient verbalized understanding. Discharge medications reviewed with the patient and appropriate educational materials and side effects teaching were provided. ___________________________________________________________________________________________________________________________________ Patient armband removed and given to patient to take home. Patient was informed of the privacy risks if armband lost or stolen Introducing [facility] SERVICES! Francisca Traylor introduces [employer] patient portal. Now you can access parts of your medical record, email your doctor's office, and request medication refills online. 1. In your internet browser, go to [email or web address] 2. Click on the First Time User? Click Here link in the Sign In box. You will see the New Member Sign Up page. 3. Enter your [employer] Access Code exactly as it appears below. You will not need to use this code after youve completed the sign-up process. If you do not sign up before the expiration date, you must request a new code. · [employer] Access Code: [email or web address] Expires: 12/25/2017  7:51 AM 
 
 4. Enter the last four digits of your Social Security Number (xxxx) and Date of Birth (mm/dd/yyyy) as indicated and click Submit. You will be taken to the next sign-up page. 5. Create a [employer] ID. This will be your [employer] login ID and cannot be changed, so think of one that is secure and easy to remember. 6. Create a [employer] password. You can change your password at any time. 7. Enter your Password Reset Question and Answer. This can be used at a later time if you forget your password. 8. Enter your e-mail address. You will receive e-mail notification when new information is available in [address] 9. Click Sign Up. You can now view and download portions of your medical record. 10. Click the Download Summary menu link to download a portable copy of your medical information. If you have questions, please visit the Frequently Asked Questions section of the [employer] website. Remember, [employer] is NOT to be used for urgent needs. For medical emergencies, dial 911. Now available from your iPhone and Android! Providers Seen During Your Hospitalization Provider Specialty Primary office phone Huyen Craig MD Colon and Rectal Surgery [phone] Your Primary Care Physician (PCP) Primary Care Physician Office Phone Office Fax Tim Munoz [phone] You are allergic to the following No active allergies Recent Documentation Height Weight BMI OB Status Smoking Status 1.676 m 89.5 kg 31.84 kg/m2 Hysterectomy Never Smoker Emergency Contacts Name Discharge Info Relation Home Work Mobile LaraChayo DISCHARGE CAREGIVER [3] Daughter [21] [phone] Patient Belongings The following personal items are in your possession at time of discharge: 
  Dental Appliances: None  Visual Aid: None      Home Medications: None   Jewelry: None  Clothing: Pants, Shirt, Socks, Footwear, Undergarments Please provide this summary of care documentation to your next provider. Signatures-by signing, you are acknowledging that this After Visit Summary has been reviewed with you and you have received a copy. Patient Signature:  ____________________________________________________________ Date:  ____________________________________________________________  
  
[facility] Gene Provider Signature:  ____________________________________________________________ Date:  ____________________________________________________________

## 2017-12-01 NOTE — OP NOTES
COLON AND RECTAL SURGERY OPERATIVE NOTE            Procedure Date: 12/1/2017    Indications: Perianal and Anal Canal Condyloma     Pre-operative Diagnosis:  anal    Post-operative Diagnosis:  anal    Title of Procedure:   Excision and Fulguration of Extensive Anal Canal and Perianal Condyloma     Surgeon(s): Henri Wheat MD    Assistants: Circ-1: Carlos A Figueredo RN  Scrub Tech-1: Jami Duarte  Surg Asst-1: Yessica Clinemir    Anesthesiologist: Anesthesiologist: Dinesh Vicente MD  CRNA: Cirilo Domingo CRNA    Anesthesia: MAC    ASA Class: ASA 3 - Severe systemic disease but compensated       Indication for surgery:   The patient is a 65 y.o., [address] female who was recently seen in clinic and was noted to have extensive perianal and anal canal condyloma. Due to the severity of the disease process, surgical excision and fulguration was discussed for more definitive management. The patient was informed of the indication for the procedure as well as the risk and complications. I discussed the details of the procedure as well as the risks of surgery including bleeding, infection, pain, anesthesia complications, possibility of recurrent disease. The patient demonstrated good understanding of surgical considerations, risks, benefits and alternatives and was willing to accept the risks of surgery. She elected to proceed with surgery, and therefore the surgery was scheduled. Procedure    Findings:  Extensive papilloma/condyloma lesions (size 1-3 mm) overlying moderate prolapsed external and internal hemorrhoid disease in all three quadrants. Small scattered perianal area (size 1 mm) lesions with circumferential involvement. Procedure Details: The patient was brought to the operating room and placed on the operating room table in the prone position after MAC anesthesia was successfully achieved by the Anesthesiology belgica.  The safety strap was carefully secured and pressure points were carefully padded. The patient was then prepped and draped in the standard sterile fashion. The pneumatic compression boots in the lower extremity were placed prior to surgery. I next injected 20 mL of 1% lidocaine with epinephrine at the operative sites for satisfactory local anesthesia. With the aid of the Dubois retractor, the anal canal was thoroughly evaluated. The patient had the extensive condyloma disease as listed in the Findings section above. No other abnormalities were noted. I proceeded with surgical excision of the larger lesions sharply. Hemostasis was achieved with electrocautery. Next the perianal lesions were systematically fulgurated with electrocautery. Both the smoke evacuator and regular suction was used to aspirate the smoke. Once the entire perianal lesions were treated, attention was focused on the remaining anal canal lesions. With the aid of the Dubois retractor, the anal canal was fully visualized. All the anal canal lesions were fulgurated. The operative sites were inspected for any bleeding and hemostasis was completely achieved with electrocautery. I next injected approximately 10 mL of 0.25% marcaine at the operative sites for post operative analgesia. A sterile dry dressing was next placed at the anal verge after bacitracin ointment was applied to the wound. The patient tolerated the procedure well and sent to the recovery room in good condition. Needle and sponge counts were correct.         Estimated Blood Loss:  5 mL           Drains: None           Total IV Fluids: 150 mL           Specimens: Sent - Anal condyloma to Pathology           Implants: None           Complications: None             Disposition: aroused from sedation, and taken to the recovery room in a stable condition           Condition: good    Surgeons Signature:       Praveena Garay MD, FACS, FASCRS  Colon and Rectal Surgery  [facility]  Office [phone]  Fax [phone]  12/1/2017  9:31 AM

## 2017-12-01 NOTE — ANESTHESIA PREPROCEDURE EVALUATION
Anesthetic History   No history of anesthetic complications            Review of Systems / Medical History  Patient summary reviewed and pertinent labs reviewed    Pulmonary  Within defined limits                 Neuro/Psych         Psychiatric history     Cardiovascular    Hypertension              Exercise tolerance: >4 METS     GI/Hepatic/Renal           Liver disease     Endo/Other      Hypothyroidism: well controlled       Other Findings   Comments:   Risk Factors for Postoperative nausea/vomiting:       History of postoperative nausea/vomiting? NO       Female? YES       Motion sickness? NO       Intended opioid administration for postoperative analgesia? YES      Smoking Abstinence  Current Smoker? NO  Elective Surgery? YES  Seen preoperatively by anesthesiologist or proxy prior to day of surgery? YES  Pt abstained from smoking 24 hours prior to anesthesia?  N/A         Physical Exam    Airway  Mallampati: II  TM Distance: 4 - 6 cm  Neck ROM: normal range of motion   Mouth opening: Normal     Cardiovascular  Regular rate and rhythm,  S1 and S2 normal,  no murmur, click, rub, or gallop  Rhythm: regular  Rate: normal         Dental    Dentition: Edentulous     Pulmonary  Breath sounds clear to auscultation               Abdominal  GI exam deferred       Other Findings            Anesthetic Plan    ASA: 3  Anesthesia type: MAC          Induction: Intravenous  Anesthetic plan and risks discussed with: Patient

## 2017-12-01 NOTE — ANESTHESIA POSTPROCEDURE EVALUATION
Post-Anesthesia Evaluation and Assessment    Patient: Ady Schulte MRN: [id number]  SSN: [id number]    YOB: 1955  Age: 65 y.o. Sex: female       Cardiovascular Function/Vital Signs  Visit Vitals    /82    Pulse 89    Temp 36.2 °C (97.1 °F)    Resp 16    Ht 5' 6\" (1.676 m)    Wt 89.5 kg (197 lb 4 oz)    SpO2 96%    BMI 31.84 kg/m2       Patient is status post MAC anesthesia for Procedure(s):  excision and fulguration of anal condyloma. Nausea/Vomiting: None    Postoperative hydration reviewed and adequate. Pain:  Pain Scale 1: Numeric (0 - 10) (12/01/17 0841)  Pain Intensity 1: 0 (12/01/17 0841)   Managed    Neurological Status:   Neuro (WDL): Exceptions to WDL (numbness/tingling in toes of both feet) (12/01/17 0643)   At baseline    Mental Status and Level of Consciousness: Alert and oriented     Pulmonary Status:   O2 Device: Room air (12/01/17 0844)   Adequate oxygenation and airway patent    Complications related to anesthesia: None    Post-anesthesia assessment completed.  No concerns    Signed By: Reynold Gilbert MD     December 1, 2017

## 2017-12-01 NOTE — H&P
[facility]  [address] [place] [address]            Colon and Rectal Surgery History and Physical    Patient: Alo Montes De Oca  MRN: [id number]  SSN: [id number]   YOB: 1955  Age: 65 y.o. Sex: female     Ms. Leandra Sesay is an 65 y.o. female referred by Dr. Meghna Farias. She underwent a screening colonoscopy exam on 9/26/2017. This showed:     1. Anal papillomas status post biopsies  2. No polyps or tumors  3. Internal hemorrhoids     The final Pathology report revealed: FINAL DIAGNOSIS:   ANUS, BIOPSY: SQUAMOUS PAPILLOMA WITH MILD ACUTE INFLAMMATION. GROSS DESCRIPTION:   The specimen is received in formalin in a container labeled with the patient's name, Alo Montes De Oca, and biopsy anus HPV and consists of one pale tan soft tissue fragment measuring 0.1 x 0.1 x 0.1 cm submitted in toto in cassette A1. MICROSCOPIC DESCRIPTION:   Sections of the tissue show an inflamed papillary epithelial proliferation. We do not see a prominent granular layer or definitive koilocytes. The features are most consistent with those of a squamous papilloma. Dysplastic changes are not observed.     Clinical exam showed scattered condyloma lesions posteriorly in the perianal region and anal canal.             Past Medical History:   Diagnosis Date    Arthritis     Right leg    Hep C w/ coma, chronic (HCC)     HIV (human immunodeficiency virus infection) ([facility] [address]     HIV disease (HCC)     Hypertension     Infectious disease     HIV, hepatitis C    Insomnia     Liver disease     Heatitis C    Psychiatric disorder     depression, anxiety    Thyroid disease     had thyroidectomy     Past Surgical History:   Procedure Laterality Date    COLONOSCOPY N/A 9/26/2017    COLONOSCOPY performed by [facility] at [facility]      x 2    HX COLONOSCOPY      HX HYSTERECTOMY  2015    HX THYROIDECTOMY       No Known Allergies  Current Facility-Administered Medications   Medication Dose Route Frequency Provider Last Rate Last Dose    [START ON 12/2/2017] lactated Ringers infusion  50 mL/hr IntraVENous CONTINUOUS Jose Jay CRNA 50 mL/hr at 12/01/17 0715 50 mL/hr at 12/01/17 0715    sodium chloride (NS) flush 5-10 mL  5-10 mL IntraVENous Q8H Jose Jay CRNA        sodium chloride (NS) flush 5-10 mL  5-10 mL IntraVENous PRN Jose Jay CRNA        famotidine (PEPCID) 20 mg tablet                  Physical Examination    Vitals:    11/29/17 0959 12/01/17 0629   BP:  (!) 160/94   Pulse:  61   Resp:  18   Temp:  97.1 °F (36.2 °C)   SpO2:  99%   Weight: 87.1 kg (192 lb) 89.5 kg (197 lb 4 oz)   Height: 5' 6\" (1.676 m) 5' 6\" (1.676 m)        Physical Exam:   GENERAL: alert, cooperative, no distress, appears stated age  LUNG: clear to auscultation bilaterally  HEART: regular rate and rhythm  ABDOMEN: soft, non-tender. Bowel sounds normal. No masses,  no organomegaly      Assessment and Plan:    Ready to proceed with exam under anesthesia with excision and fulguration of anal lesions. Nat Curling, MD, FACS, FASCRS  Colon and Rectal Surgery  [facility] [employer] [facility]  Office [phone]  Fax     [phone]  12/1/2017  7:49 AM     [address] [employer]

## 2017-12-01 NOTE — PROGRESS NOTES
conducted a pre-surgery visit with Lavell Allen, who is a 65 y.o.,female. The  provided the following Interventions:  Initiated a relationship of care and support. Offered prayer and assurance of continued prayers on patient's behalf. Plan:  Chaplains will continue to follow and will provide pastoral care on an as needed/requested basis.  recommends bedside caregivers page  on duty if patient shows signs of acute spiritual or emotional distress.     Rubi [place]   [facility]   [phone]

## 2017-12-02 LAB
BUN BLD-MCNC: 24 MG/DL (ref 7–18)
CHLORIDE BLD-SCNC: 108 MMOL/L (ref 100–108)
GLUCOSE BLD STRIP.AUTO-MCNC: 85 MG/DL (ref 74–106)
HCT VFR BLD CALC: 40 % (ref 36–49)
HGB BLD-MCNC: 13.6 G/DL (ref 12–16)
POTASSIUM BLD-SCNC: ABNORMAL MMOL/L (ref 3.5–5.5)
SODIUM BLD-SCNC: 145 MMOL/L (ref 136–145)

## 2017-12-11 ENCOUNTER — OFFICE VISIT (OUTPATIENT)
Dept: SURGERY | Age: 62
End: 2017-12-11

## 2017-12-11 VITALS
SYSTOLIC BLOOD PRESSURE: 136 MMHG | BODY MASS INDEX: 32.47 KG/M2 | WEIGHT: 202 LBS | DIASTOLIC BLOOD PRESSURE: 82 MMHG | HEIGHT: 66 IN | OXYGEN SATURATION: 98 % | TEMPERATURE: 96 F | HEART RATE: 74 BPM | RESPIRATION RATE: 17 BRPM

## 2017-12-11 DIAGNOSIS — A63.0 ANAL CONDYLOMATA: Primary | ICD-10-CM

## 2017-12-11 DIAGNOSIS — K64.8 HEMORRHOIDS WITH COMPLICATION: ICD-10-CM

## 2017-12-11 RX ORDER — HYDROCORTISONE 25 MG/G
CREAM TOPICAL
Qty: 30 G | Refills: 0 | Status: SHIPPED | OUTPATIENT
Start: 2017-12-11

## 2017-12-11 NOTE — MR AVS SNAPSHOT
Visit Information Date & Time Provider Department Dept. Phone Encounter #  
 12/11/2017  1:30 PM Ashlie Mulligan MD [facility] [phone] [id number] Follow-up Instructions Return in about 1 month (around 1/11/2018). Follow-up and Disposition History Your Appointments 1/2/2018  2:45 PM  
Follow Up with Akash Garcia MD  
[address] [facility], [address] [facility] [address] [place] [address]) Appt Note: f/u back pain [address] [facility] [address]  
[phone]  
  
   
 [address] [facility],[address] Treva Hughes  
  
    
 1/9/2018 10:30 AM  
Follow Up with Ashlie Mulligan MD  
[address] [place] [address] [facility]) Appt Note: one month follow up  
 [address] [facility] [address]  
  
   
 [address] [facility] [address] [facility] 951 Upcoming Health Maintenance Date Due Hepatitis C Screening 1955 BREAST CANCER SCRN MAMMOGRAM 5/6/2006 FOBT Q 1 YEAR AGE 50-75 5/6/2006 PAP AKA CERVICAL CYTOLOGY 2/14/2015 ZOSTER VACCINE AGE 60> 3/6/2016 Influenza Age 5 to Adult 8/1/2017 DTaP/Tdap/Td series (2 - Td) 10/31/2022 Allergies as of 12/11/2017  Review Complete On: 12/11/2017 By: Ashlie Mulligan MD  
 No Known Allergies Current Immunizations  Never Reviewed Name Date TDAP Vaccine 10/31/2012 10:36 AM  
  
 Not reviewed this visit You Were Diagnosed With   
  
 Codes Comments Anal condylomata    -  Primary ICD-10-CM: A63.0 ICD-9-CM: 078.11 Hemorrhoids with complication     BUG-72-DD: K64.8 ICD-9-CM: 455.8 Vitals BP Pulse Temp Resp Height(growth percentile) Weight(growth percentile) 136/82 (BP 1 Location: Left arm, BP Patient Position: Sitting) 74 96 °F (35.6 °C) (Oral) 17 5' 6\" (1.676 m) 202 lb (91.6 kg) SpO2 BMI OB Status Smoking Status 98% 32.6 kg/m2 Hysterectomy Never Smoker Vitals History BMI and BSA Data Body Mass Index Body Surface Area  
 32.6 kg/m 2 2.07 m 2 Preferred Pharmacy Pharmacy Name Phone [facility], [facility] 12 [phone] Your Updated Medication List  
  
   
This list is accurate as of: 12/11/17  3:52 PM.  Always use your most recent med list.  
  
  
  
  
 albuterol 90 mcg/actuation inhaler Commonly known as:  PROVENTIL HFA, VENTOLIN HFA, PROAIR HFA Take 2 Puffs by inhalation every four (4) hours as needed. amitriptyline 25 mg tablet Commonly known as:  ELAVIL  
take 1 tablet by mouth at bedtime  
  
 amLODIPine 10 mg tablet Commonly known as:  Clarissa Darting Take 10 mg by mouth daily. DESCOVY tablet Generic drug:  emtricitabine-tenofovir alafen  
  
 hydroCHLOROthiazide 12.5 mg capsule Commonly known as:  MICROZIDE  
25 mg.  
  
 hydrocortisone 2.5 % rectal cream  
Commonly known as:  PROCTOSOL HC Insert  into rectum three (3) times daily as needed for Hemorrhoids. levothyroxine 100 mcg tablet Commonly known as:  SYNTHROID Take 100 mcg by mouth Daily (before breakfast). lisinopril 10 mg tablet Commonly known as:  PRINIVIL, ZESTRIL  
take 1 tablet by mouth once daily NORVIR 100 mg tablet Generic drug:  ritonavir Norvir 100 MG Oral Tablet TAKE 1 TABLET DAILY  Quantity: 30;  Refills: 5    CAILIN TOLBERT;  Started 25-Aug-2010 Active  
  
 oxyCODONE-acetaminophen 5-325 mg per tablet Commonly known as:  PERCOCET Take 1 Tab by mouth every four (4) hours as needed for Pain. Max Daily Amount: 6 Tabs. REYATAZ 300 mg capsule Generic drug:  atazanavir Take 300 mg by mouth daily. Prescriptions Sent to Pharmacy Refills  
 hydrocortisone (PROCTOSOL HC) 2.5 % rectal cream 0 Sig: Insert  into rectum three (3) times daily as needed for Hemorrhoids.   
 Class: Normal  
 [facility]: [facility] [address] [place] [address] Sang Garzon  #: [phone] Route: Rectal  
  
Follow-up Instructions Return in about 1 month (around 1/11/2018). Patient Instructions If you have any questions or concerns about today's appointment, the verbal and/or written instructions you were given for follow up care, please call our office at [phone]. [facility] - [address], [address] 
 
[phone] office [phone] Introducing [facility] [place] [facility]! [facility] introduces [employer] patient portal. Now you can access parts of your medical record, email your doctor's office, and request medication refills online. 1. In your internet browser, go to [email or web address]. [employer] 2. Click on the First Time User? Click Here link in the Sign In box. You will see the New Member Sign Up page. 3. Enter your [employer] Access Code exactly as it appears below. You will not need to use this code after youve completed the sign-up process. If you do not sign up before the expiration date, you must request a new code. · [employer] Access Code: [email or web address] Expires: 12/25/2017  7:51 AM 
 
4. Enter the last four digits of your Social Security Number (xxxx) and Date of Birth (mm/dd/yyyy) as indicated and click Submit. You will be taken to the next sign-up page. 5. Create a 4th aspectt ID. This will be your [employer] login ID and cannot be changed, so think of one that is secure and easy to remember. 6. Create a [employer] password. You can change your password at any time. 7. Enter your Password Reset Question and Answer. This can be used at a later time if you forget your password. 8. Enter your e-mail address. You will receive e-mail notification when new information is available in [address] 9. Click Sign Up. You can now view and download portions of your medical record. 10. Click the Download Summary menu link to download a portable copy of your medical information. If you have questions, please visit the Frequently Asked Questions section of the [employer] website. Remember, [employer] is NOT to be used for urgent needs. For medical emergencies, dial 911. Now available from your iPhone and Android! Please provide this summary of care documentation to your next provider. Your primary care clinician is listed as Raudel Tadeo. If you have any questions after today's visit, please call [phone].

## 2017-12-11 NOTE — PATIENT INSTRUCTIONS
If you have any questions or concerns about today's appointment, the verbal and/or written instructions you were given for follow up care, please call our office at [phone].     [facility] - [address]    [phone] office  [phone]

## 2017-12-11 NOTE — PROGRESS NOTES
Patient presents today for pos op visit excision and fulguration and condycoma. 1. Have you been to the ER, urgent care clinic since your last visit? Hospitalized since your last visit? No    2. Have you seen or consulted any other health care providers outside of the [address] [place] [address] since your last visit? Include any pap smears or colon screening.  No

## 2017-12-11 NOTE — PROGRESS NOTES
[facility]  [address] [place] [address], [address] [place] [address]              Patient: Nabil Morgan  Admitted: (Not on file) MRN: [id number]     Age:  65 y.o.,      Sex: female    YOB: 1955       Subjective    Ms. Stevens Client is an 65 y.o. female who is status post Excision and Fulguration of Extensive Anal Canal and Perianal Condyloma on 12/1/2017. The intraoperative findings showed extensive papilloma/condyloma lesions (size 1-3 mm) overlying moderate prolapsed external and internal hemorrhoid disease in all three quadrants. Small scattered perianal area (size 1 mm) lesions with circumferential involvement were present.       She is currently without complaints except for flare-up of her hemorrhoid disease. Her bowels are regular, and the patient denies fever. Objective    Vitals:    12/11/17 1335   BP: 136/82   Pulse: 74   Resp: 17   Temp: 96 °F (35.6 °C)   TempSrc: Oral   SpO2: 98%   Weight: 91.6 kg (202 lb)   Height: 5' 6\" (1.676 m)   PainSc:   5   PainLoc: Buttocks       Physical Exam:  General: alert, cooperative, no distress, appears stated age  Anorectal:  With the patient in the prone position the anus appeared abnormal with findings of moderately inflamed mixed hemorrhoids in all quadrants. The operative sites were satisfactorily healed. Digital rectal examination revealed Normal sphincter tone and squeeze pressure. Palpation revealed No Masses. Assessment / Plan    Ms. Stevens Client is Doing well postoperatively. The patient will continue the local wound care until the operative site has completely healed. I also prescribed Proctosol HC to manage her hemorrhoid disease. Return in 4 weeks for follow up.           Tawanda Ojeda MD, FACS, FASCRS  Colon and Rectal Surgery  [facility]  Office [phone]  Fax     [phone]  12/11/2017  2:34 PM

## 2018-01-02 ENCOUNTER — OFFICE VISIT (OUTPATIENT)
Dept: ORTHOPEDIC SURGERY | Age: 63
End: 2018-01-02

## 2018-01-02 VITALS
TEMPERATURE: 97.2 F | HEART RATE: 66 BPM | RESPIRATION RATE: 18 BRPM | SYSTOLIC BLOOD PRESSURE: 146 MMHG | DIASTOLIC BLOOD PRESSURE: 89 MMHG | WEIGHT: 201.8 LBS | OXYGEN SATURATION: 96 % | BODY MASS INDEX: 32.43 KG/M2 | HEIGHT: 66 IN

## 2018-01-02 DIAGNOSIS — M79.18 MYOFASCIAL PAIN: Primary | ICD-10-CM

## 2018-01-02 NOTE — PROGRESS NOTES
No pain right now but does has pain through out the day with activity, getting out of chair, out of bed, etc.

## 2018-01-02 NOTE — PATIENT INSTRUCTIONS
Low Back Pain: Exercises  Your Care Instructions  Here are some examples of typical rehabilitation exercises for your condition. Start each exercise slowly. Ease off the exercise if you start to have pain. Your doctor or physical therapist will tell you when you can start these exercises and which ones will work best for you. How to do the exercises  Press-up    1. Lie on your stomach, supporting your body with your forearms. 2. Press your elbows down into the floor to raise your upper back. As you do this, relax your stomach muscles and allow your back to arch without using your back muscles. As your press up, do not let your hips or pelvis come off the floor. 3. Hold for 15 to 30 seconds, then relax. 4. Repeat 2 to 4 times. Alternate arm and leg (bird dog) exercise    Do this exercise slowly. Try to keep your body straight at all times, and do not let one hip drop lower than the other. 1. Start on the floor, on your hands and knees. 2. Tighten your belly muscles. 3. Raise one leg off the floor, and hold it straight out behind you. Be careful not to let your hip drop down, because that will twist your trunk. 4. Hold for about 6 seconds, then lower your leg and switch to the other leg. 5. Repeat 8 to 12 times on each leg. 6. Over time, work up to holding for 10 to 30 seconds each time. 7. If you feel stable and secure with your leg raised, try raising the opposite arm straight out in front of you at the same time. Knee-to-chest exercise    1. Lie on your back with your knees bent and your feet flat on the floor. 2. Bring one knee to your chest, keeping the other foot flat on the floor (or keeping the other leg straight, whichever feels better on your lower back). 3. Keep your lower back pressed to the floor. Hold for at least 15 to 30 seconds. 4. Relax, and lower the knee to the starting position. 5. Repeat with the other leg. Repeat 2 to 4 times with each leg.   6. To get more stretch, put your other leg flat on the floor while pulling your knee to your chest.  Curl-ups    1. Lie on the floor on your back with your knees bent at a 90-degree angle. Your feet should be flat on the floor, about 12 inches from your buttocks. 2. Cross your arms over your chest. If this bothers your neck, try putting your hands behind your neck (not your head), with your elbows spread apart. 3. Slowly tighten your belly muscles and raise your shoulder blades off the floor. 4. Keep your head in line with your body, and do not press your chin to your chest.  5. Hold this position for 1 or 2 seconds, then slowly lower yourself back down to the floor. 6. Repeat 8 to 12 times. Pelvic tilt exercise    1. Lie on your back with your knees bent. 2. \"Brace\" your stomach. This means to tighten your muscles by pulling in and imagining your belly button moving toward your spine. You should feel like your back is pressing to the floor and your hips and pelvis are rocking back. 3. Hold for about 6 seconds while you breathe smoothly. 4. Repeat 8 to 12 times. Heel dig bridging    1. Lie on your back with both knees bent and your ankles bent so that only your heels are digging into the floor. Your knees should be bent about 90 degrees. 2. Then push your heels into the floor, squeeze your buttocks, and lift your hips off the floor until your shoulders, hips, and knees are all in a straight line. 3. Hold for about 6 seconds as you continue to breathe normally, and then slowly lower your hips back down to the floor and rest for up to 10 seconds. 4. Do 8 to 12 repetitions. Hamstring stretch in doorway    1. Lie on your back in a doorway, with one leg through the open door. 2. Slide your leg up the wall to straighten your knee. You should feel a gentle stretch down the back of your leg. 3. Hold the stretch for at least 15 to 30 seconds. Do not arch your back, point your toes, or bend either knee.  Keep one heel touching the floor and the other heel touching the wall. 4. Repeat with your other leg. 5. Do 2 to 4 times for each leg. Hip flexor stretch    1. Kneel on the floor with one knee bent and one leg behind you. Place your forward knee over your foot. Keep your other knee touching the floor. 2. Slowly push your hips forward until you feel a stretch in the upper thigh of your rear leg. 3. Hold the stretch for at least 15 to 30 seconds. Repeat with your other leg. 4. Do 2 to 4 times on each side. Wall sit    1. Stand with your back 10 to 12 inches away from a wall. 2. Lean into the wall until your back is flat against it. 3. Slowly slide down until your knees are slightly bent, pressing your lower back into the wall. 4. Hold for about 6 seconds, then slide back up the wall. 5. Repeat 8 to 12 times. Follow-up care is a key part of your treatment and safety. Be sure to make and go to all appointments, and call your doctor if you are having problems. It's also a good idea to know your test results and keep a list of the medicines you take. Where can you learn more? Go to http://[employer].info/. Enter I473 in the search box to learn more about \"Low Back Pain: Exercises. \"  Current as of: March 21, 2017  Content Version: 11.4  © 0257-9361 Healthwise, Incorporated. Care instructions adapted under license by [employer] (which disclaims liability or warranty for this information). If you have questions about a medical condition or this instruction, always ask your healthcare professional. [place] [address] any warranty or liability for your use of this information. Trochanteric Bursitis: Exercises  Your Care Instructions  Here are some examples of typical rehabilitation exercises for your condition. Start each exercise slowly. Ease off the exercise if you start to have pain.   Your doctor or physical therapist will tell you when you can start these exercises and which ones will work best for you. How to do the exercises  Hamstring wall stretch    5. Lie on your back in a doorway, with your good leg through the open door. 6. Slide your affected leg up the wall to straighten your knee. You should feel a gentle stretch down the back of your leg. 1. Do not arch your back. 2. Do not bend either knee. 3. Keep one heel touching the floor and the other heel touching the wall. Do not point your toes. 7. Hold the stretch for at least 1 minute to begin. Then try to lengthen the time you hold the stretch to as long as 6 minutes. 8. Repeat 2 to 4 times. 9. If you do not have a place to do this exercise in a doorway, there is another way to do it:  10. Lie on your back, and bend the knee of your affected leg. 11. Loop a towel under the ball and toes of that foot, and hold the ends of the towel in your hands. 12. Straighten your knee, and slowly pull back on the towel. You should feel a gentle stretch down the back of your leg. 13. Hold the stretch for 15 to 30 seconds. Or even better, hold the stretch for 1 minute if you can. 14. Repeat 2 to 4 times. Straight-leg raises to the outside    8. Lie on your side, with your affected leg on top. 9. Tighten the front thigh muscles of your top leg to keep your knee straight. 10. Keep your hip and your leg straight in line with the rest of your body, and keep your knee pointing forward. Do not drop your hip back. 11. Lift your top leg straight up toward the ceiling, about 12 inches off the floor. Hold for about 6 seconds, then slowly lower your leg. 12. Repeat 8 to 12 times. Clamshell    7. Lie on your side, with your affected leg on top and your head propped on a pillow. Keep your feet and knees together and your knees bent. 8. Raise your top knee, but keep your feet together. Do not let your hips roll back. Your legs should open up like a clamshell. 9. Hold for 6 seconds. 10. Slowly lower your knee back down.  Rest for 10 seconds. 11. Repeat 8 to 12 times. Standing quadriceps stretch    7. If you are not steady on your feet, hold on to a chair, counter, or wall. You can also lie on your stomach or your side to do this exercise. 8. Bend the knee of the leg you want to stretch, and reach behind you to grab the front of your foot or ankle with the hand on the same side. For example, if you are stretching your right leg, use your right hand. 9. Keeping your knees next to each other, pull your foot toward your buttock until you feel a gentle stretch across the front of your hip and down the front of your thigh. Your knee should be pointed directly to the ground, and not out to the side. 10. Hold the stretch for 15 to 30 seconds. 11. Repeat 2 to 4 times. Piriformis stretch    5. Lie on your back with your legs straight. 6. Lift your affected leg and bend your knee. With your opposite hand, reach across your body, and then gently pull your knee toward your opposite shoulder. 7. Hold the stretch for 15 to 30 seconds. 8. Repeat 2 to 4 times. Double knee-to-chest    5. Lie on your back with your knees bent and your feet flat on the floor. You can put a small pillow under your head and neck if it is more comfortable. 6. Bring both knees to your chest.  7. Keep your lower back pressed to the floor. Hold for 15 to 30 seconds. 8. Relax, and lower your knees to the starting position. 9. Repeat 2 to 4 times. Follow-up care is a key part of your treatment and safety. Be sure to make and go to all appointments, and call your doctor if you are having problems. It's also a good idea to know your test results and keep a list of the medicines you take. Where can you learn more? Go to http://[employer]-noris.info/. Enter Z373 in the search box to learn more about \"Trochanteric Bursitis: Exercises. \"  Current as of: March 21, 2017  Content Version: 11.4  © 8054-9216 Healthwise, Incorporated.  Care instructions adapted under license by [address] (which disclaims liability or warranty for this information). If you have questions about a medical condition or this instruction, always ask your healthcare professional. [address] any warranty or liability for your use of this information.

## 2018-01-02 NOTE — PROGRESS NOTES
[address]  [address], [address]  [place], [address] [place] [address] Street  Phone: [phone]  Fax: [phone]        Deonna Rubi  : 1955  PCP: Tatyana Garrett MD  2018    PROGRESS NOTE      ASSESSMENT AND PLAN    Nidia Hugo comes in to the office today for a PRN f/u. She presents today with tenderness in the upper lumbar region. This leads me to believe her pain is associated to myofascial syndrome. She does not experience pain without movement. I referred her to PT. Pt will f/u in 6 weeks or sooner as needed. Diagnoses and all orders for this visit:     1. Myofascial pain  -     REFERRAL TO PHYSICAL THERAPY         Follow-up Disposition:  Return in about 6 weeks (around 2018), or if symptoms worsen or fail to improve. HISTORY OF PRESENT ILLNESS  Nidia Hugo is a 65 y.o. female. A&P / HPI from 2016:  Nidia Hugo comes in to the office today c/o lumbar pain that radiates down the back of her right leg. She has had a lumbar MRI recently. She has working diagnoses of lumbar radiculopathy, lumbar stenosis, annular tear, and facet syndrome.      She was referred for a Right L4 SNRB for her lumbar radiculopathy pain. We will also consider an L3/L4 intralaminar injection in the future. We will address the annular tear and facet syndrome at a later date as her pain is predominantly coming from the lumbar radiculopathy and lumbar stenosis. We will also discuss referring her to PT in the future. We will also discuss surgical options in the future if necessary after trying these more conservative treatments. She was given a Toradol injection today. Pt also mentioned she would like to be weaned off her medications, but we will defer this to her PCP and other specialists.      She will f/u in 3 weeks, or prn.      Updates from 16:  Pt presents for a right L4 SNRB f/u.  She found some relief for 4 days.      Her pain today is in the lateral side of her right leg.      Updates from 10/25/16:  Pt presents for a second right L4 SNRB f/u. She found relief for about 10 days before the pain returned. Pt is experiencing a lot of pain today.      She is ambulating in a wheelchair today, likely due to pain. Pt reports issues with walking but states she can bike just fine.     Updates from 12/27/16:  Pt presents for an L3-4 interlaminar injection f/u. She found relief for several days before the pain returned.     She is not taking diclofenac as she was afraid it would conflict with her drinking. Pt states she is taking Gabapentin.     Pt is continuing to ambulate in a wheelchair. Updates from 01/02/18:  Pt presents for a PRN f/u. Her primary complaint today is severe mid back pain which is provoked by standing. She has discontinued the Gabapentin however, her neuropathy is not an issue at this time. PAST MEDICAL HISTORY   Past Medical History:   Diagnosis Date    Arthritis     Right leg    Hep C w/ coma, chronic (HCC)     [facility] [address]     HIV disease ([facility] [address]     Hypertension     Infectious disease     HIV, hepatitis C    Insomnia     Liver disease     Heatitis C    Psychiatric disorder     depression, anxiety    Thyroid disease     had thyroidectomy       Past Surgical History:   Procedure Laterality Date    COLONOSCOPY N/A 9/26/2017    COL[facility]      x 2    HX COLONOSCOPY      HX HYSTERECTOMY  2015    HX THYROIDECTOMY     . MEDICATIONS      Current Outpatient Prescriptions   Medication Sig Dispense Refill    lisinopril (PRINIVIL, ZESTRIL) 10 mg tablet take 1 tablet by mouth once daily  0    hydroCHLOROthiazide (MICROZIDE) 12.5 mg capsule 25 mg.      amLODIPine (NORVASC) 10 mg tablet Take 10 mg by mouth daily. 0    atazanavir (REYATAZ) 300 mg capsule Take 300 mg by mouth daily.       levothyroxine (SYNTHROID) 100 mcg tablet Take 100 mcg by mouth Daily (before breakfast).  hydrocortisone (PROCTOSOL HC) 2.5 % rectal cream Insert  into rectum three (3) times daily as needed for Hemorrhoids. 30 g 0    oxyCODONE-acetaminophen (PERCOCET) 5-325 mg per tablet Take 1 Tab by mouth every four (4) hours as needed for Pain. Max Daily Amount: 6 Tabs. 25 Tab 0    albuterol (PROVENTIL HFA, VENTOLIN HFA, PROAIR HFA) 90 mcg/actuation inhaler Take 2 Puffs by inhalation every four (4) hours as needed.  DESCOVY tablet   0    amitriptyline (ELAVIL) 25 mg tablet take 1 tablet by mouth at bedtime  0    ritonavir (NORVIR) 100 mg tablet Norvir 100 MG Oral Tablet  TAKE 1 TABLET DAILY   Quantity: 30;  Refills: 5       CAILIN TOLBERT P.ATahmina;  Started 25-Aug-2010  Active          ALLERGIES  No Known Allergies       SOCIAL HISTORY    Social History     Social History    Marital status:      Spouse name: N/A    Number of children: N/A    Years of education: N/A     Social History Main Topics    Smoking status: Never Smoker    Smokeless tobacco: Never Used    Alcohol use Yes      Comment: occasionally    Drug use: Yes     Special: Cocaine, Marijuana      Comment: hx of cocaine use in july, last use of marijuana in august    Sexual activity: Not on file     Other Topics Concern    Not on file     Social History Narrative     Social History Narrative      Problem Relation Age of Onset    Family history unknown: Yes         REVIEW OF SYSTEMS  Review of Systems   Constitutional: Negative for chills, diaphoresis, fever, malaise/fatigue and weight loss. Respiratory: Negative for shortness of breath. Cardiovascular: Negative for chest pain and leg swelling. Gastrointestinal: Negative for constipation, nausea and vomiting. Neurological: Negative for dizziness, tingling, seizures, loss of consciousness, weakness and headaches. Psychiatric/Behavioral: The patient does not have insomnia.            PHYSICAL EXAMINATION  Visit Vitals    /89    Pulse 66    Temp 97.2 °F (36.2 °C) (Oral)    Resp 18    Ht 5' 6\" (1.676 m)    Wt 201 lb 12.8 oz (91.5 kg)    SpO2 96%    BMI 32.57 kg/m2       Pain Assessment  1/2/2018   Location of Pain Back; Hip   Location Modifiers -   Severity of Pain 0   Quality of Pain -   Quality of Pain Comment -   Duration of Pain -   Frequency of Pain -   Aggravating Factors -   Aggravating Factors Comment -   Limiting Behavior -   Relieving Factors -   Relieving Factors Comment -   Result of Injury -           Constitutional:  Well developed, well nourished, in no acute distress. Psychiatric: Affect and mood are appropriate. Integumentary: No rashes or abrasions noted on exposed areas. SPINE/MUSCULOSKELETAL EXAM    Cervical spine:  Neck is midline.    Normal muscle tone.    No focal atrophy is noted.    ROM pain free.    Shoulder ROM intact.    No tenderness to palpation. Negative Spurling's sign.    Negative Tinel's sign.    Negative Santillan's sign.        Sensation in the bilateral arms grossly intact to light touch.        Lumbar spine:  No rash, ecchymosis, or gross obliquity.    No fasciculations.    No focal atrophy is noted.    No pain with hip ROM.    Full range of motion. Mild tenderness to palpation. No tenderness to palpation at the sciatic notch.    SI joints non-tender.    Trochanters non tender.      Sensory disturbances in Right L4, L5, and S1 dermatomes. MOTOR:      Biceps  Triceps Deltoids Wrist Ext Wrist Flex Hand Intrin   Right 5/5 5/5 5/5 5/5 5/5 5/5   Left 5/5 5/5 5/5 5/5 5/5 5/5             Hip Flex  Quads Hamstrings Ankle DF EHL Ankle PF   Right 5/5 5/5 5/5 5/5 5/5 5/5   Left 5/5 5/5 5/5 5/5 5/5 5/5     DTRs are 2+ biceps, triceps, brachioradialis, patella, and Achilles.      Negative Straight Leg raise.    Squat not tested.    No difficulty with tandem gait.        Ambulation in a wheelchair (10/25/2016). FWB.         RADIOGRAPHS  Lumbar MRI images taken on 7/12/2016 personally reviewed with patient:  Vertebral bodies are normal in stature. Bone marrow signal is normal,  without bone marrow signal replacement or bone marrow edema. No paraspinal  edema. Nonspecific subcutaneous fat edema is present posterior to the lumbar  spine.      Multilevel facet arthropathy is present. Grade 1 anterolisthesis is present at  L3-4 measuring 2 mm, grade 1 anterolisthesis at L4-5 measures 3 mm. Alignment is  otherwise within normal range.      The conus medullaris terminates at L1 and appears normal.      T12-L1: Patent spinal canal and neural foramina.      L1-2: Mild hypertrophic facet arthropathy. Patent spinal canal and neural  foramina.      L2-3: Mild hypertrophic facet arthropathy. Small amount of fluid signal is  present in the left facet joint compatible with a facet joint effusion. Patent  spinal canal and neural foramina.      L3-4: Mild annular bulge. Small concentric annular tear left posterolateral  aspect of the disc adjacent to the left foramen. Mild to moderate facet  arthropathy and mild ligamentum flavum hypertrophy is present. Grade 1  anterolisthesis is noted. AP diameter of the dural sac in the midline measures  12 mm. No significant foraminal or canal stenosis.      L4-5: Grade 1 anterolisthesis. Small broad-based extrusion extending slightly  cephalad to the disc level, passing across the anterior aspect of the spinal  canal and extending into both neural foramina, right greater than left. Moderate  facet arthropathy is present with moderate amount of fluid signal within both  facet joints compatible with facet joint effusions. The ligamentum flavum are  modestly hypertrophied bilaterally. Posterior epidural fat is partially  preserved. AP diameter of the spinal canal in the midline measures 7 mm. Contact  of the left L4 root along its medial aspect is present in the foramen, without  displacement or flattening of the root.  Contact of the right L4 root along its  anterior aspect is present with posterior displacement of the root.      L5-S1: Mild facet arthropathy. Patent spinal canal and neural foramina.      IMPRESSION:  1. Moderate multifactorial L4-5 central canal stenosis (disc extrusion, facet  arthropathy with grade 1 anterolisthesis, ligamentum flavum hypertrophy). Bilateral L4-5 right greater than left foraminal stenoses secondary to extruded  disc material, with bilateral L4 right greater than left foraminal root  impingement. 2. Multilevel facet arthropathy with moderate bilateral L4-5 facet joint  effusions, small left L2-3 facet joint effusion. 3. Mild L3-4 annular bulge without significant canal or foraminal stenosis. Small concentric annular tear and left posterolateral aspect of the L3-4 disc.       reviewed      Ms. Mckenzie Larry has a reminder for a \"due or due soon\" health maintenance. I have asked that she contact her primary care provider for follow-up on this health maintenance.        This plan was reviewed with the patient and patient agrees. All questions were answered. More than half of this visit today was spent on counseling.      Written by Korey Britt, as dictated by Dr. Brenda Sarabia. I, Dr. Brenda Sarabia, confirm that all documentation is accurate.

## 2018-03-12 ENCOUNTER — APPOINTMENT (OUTPATIENT)
Dept: PHYSICAL THERAPY | Age: 63
End: 2018-03-12

## 2018-03-16 ENCOUNTER — APPOINTMENT (OUTPATIENT)
Dept: PHYSICAL THERAPY | Age: 63
End: 2018-03-16

## 2018-03-23 ENCOUNTER — APPOINTMENT (OUTPATIENT)
Dept: PHYSICAL THERAPY | Age: 63
End: 2018-03-23

## 2018-03-30 ENCOUNTER — HOSPITAL ENCOUNTER (OUTPATIENT)
Dept: PHYSICAL THERAPY | Age: 63
Discharge: HOME | End: 2018-03-30
Payer: MEDICAID

## 2018-03-30 PROCEDURE — 97161 PT EVAL LOW COMPLEX 20 MIN: CPT

## 2018-03-30 PROCEDURE — G8978 MOBILITY CURRENT STATUS: HCPCS

## 2018-03-30 PROCEDURE — G8979 MOBILITY GOAL STATUS: HCPCS

## 2018-03-30 NOTE — PROGRESS NOTES
[address] PHYSICAL THERAPY  [address] [place] [address] [address],Maria Elena Coy, 70 [facility] - Phone: [phone]  Fax: [phone] / 3031 [facility]  Patient Name: Lisa Mckay : 1955   Medical   Diagnosis: Low back pain [M54.5] Treatment Diagnosis: Low back pain [M54.5]   Onset Date:      Referral Source: Juanita Romberg, MD Start of Care [facility]): 3/30/2018   Prior Hospitalization: See medical history Provider #: [id number]   Prior Level of Function: Limited walking tolerance, household chores due to back pain; recreational exercise: outdoor bike riding   Comorbidities: HIV, Hepatitis C, arthritis, lumbar HNP, HTN, depression   Medications: Verified on Patient Summary List   The Plan of Care and following information is based on the information from the initial evaluation.   ===========================================================================================  Assessment / key information:  Pt is a 65y.o. year old female with subjective complaints of chronic, insidious LBP/ R hip pain. Pt states she has been pain free since R hip cortisone injection a couple weeks ago. Previous MRI's (+) mod L4-5 stenosis, facet arthropathy, grade 1 anterolisthesis. Current pain is rated as 0/10. Reports intermittent numbness to toes. Current functional limitations: standing tolerance ~15 minutes, walking (unable to walk through grocery store). FOTO score= 60/100 indicating 40% impairment to functional activities. Today's evaulation is significant for: functional impairment in sit to stand transfers with heavy use of UE's and multiple attempts. Lumbar AROM grossly WNL all planes with increased R hip pain noted into R SB'ing. Bilateral LE strength grossly 4+ to 5/5 except Glute Med R 3-*/5, L 4+/5; Glute max R 3+*/5, L 4-/5, unable to perform bridge due to pain/weakness. Pt with (+) JUAN RLE.   Minimal tightness to bilateral hamstrings. Significant TTP and hypertonicity to b/l thoraco-lumbar paraspinals,QL and to Right psoas, glute med, ITB. These findings are supportive for diagnosis of R hip OA and LBP. Pt will be a good candidate for skilled PT to address these impairments and promote return to normal ADLs and functional mobility for improved quality of life.    ===========================================================================================  Eval Complexity: History HIGH Complexity :3+ comorbidities / personal factors will impact the outcome/ POC ;  Examination  MEDIUM Complexity : 3 Standardized tests and measures addressing body structure, function, activity limitation and / or participation in recreation ; Presentation MEDIUM Complexity : Evolving with changing characteristics ; Decision Making MEDIUM Complexity : FOTO score of 26-74; Overall Complexity MEDIUM  Problem List: pain affecting function, decrease ROM, decrease strength, impaired gait/ balance, decrease ADL/ functional abilitiies, decrease activity tolerance, decrease flexibility/ joint mobility and decrease transfer abilities   Treatment Plan may include any combination of the following: Therapeutic exercise, Therapeutic activities, Neuromuscular re-education, Physical agent/modality, Gait/balance training, Manual therapy and Patient education  Patient / Family readiness to learn indicated by: asking questions, trying to perform skills and interest  Persons(s) to be included in education: patient (P)  Barriers to Learning/Limitations: None  Measures taken:    Patient Goal (s): \"how to deal with the pain\"   Patient self reported health status: poor  Rehabilitation Potential: good   Short Term Goals: To be accomplished in  2-3  weeks:  1. Pt will be educated in appropriate HEP to improve LE strength/stability for amb, standing, and transfers.   2. Patient will increase right glute med strength by 1/3 MMT to promote decreased pain with walking. 3. Pt will sit to stand from standard height chair x 5 reps without use of UE's.  Long Term Goals: To be accomplished in  4-6  weeks:  1. Pt will increase walking tolerance to >/= 30 minutes in order to improve ease with grocery shopping. 2. Pt will achieve >/= +4 GROC in order to promote increased activity tolerance. 3. Pt will demonstrate neutral spine with performing supine stability exercises for improved stability with daily tasks. 4. Pt will demonstrate bridge to 50% in order to improve axial stability for prolonged standing. 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 The severity rating is based on the FOTO Score  Therapist Signature: Juan Antonio Lu PT Date: 8/03/5406   Certification Period: 03/30/18 to 6/28/2018 Time: 8:01 AM   ===========================================================================================  I certify that the above Physical Therapy Services are being furnished while the patient is under my care. I agree with the treatment plan and certify that this therapy is necessary. Physician Signature:        Date:       Time:     Please sign and return to [facility], [facility]. or you may fax the signed copy to [phone]. Thank you.

## 2018-03-30 NOTE — PROGRESS NOTES
PHYSICAL THERAPY - DAILY TREATMENT NOTE    Patient Name: Jennifer Grover        Date: 3/30/2018  : 1955   yes Patient  Verified  Visit #:   1     Insurance: Payor: [facility] MEDICAID / Plan: [facility] / Product Type: Managed Care Medicaid /      In time:  Out time:    Total Treatment Time: 35     Medicare Time Tracking (below)   Total Timed Codes (min):  35 1:1 Treatment Time:  35     TREATMENT AREA =  Low back pain [M54.5]    SUBJECTIVE  Pain Level (on 0 to 10 scale):  0  / 10   Medication Changes/New allergies or changes in medical history, any new surgeries or procedures?    no  If yes, update Summary List   Subjective Functional Status/Changes:  []  No changes reported     See POC          OBJECTIVE    See exam on chart for details on objective findings\       min Patient Education:  yes  Pt ed on safe back mechanics with retrieving items from floor; ed to avoid flexion and rotation. Pt ed to limit lifting/carrying to tolerance. []  Progressed/Changed HEP based on: Other Objective/Functional Measures:    See POC     Post Treatment Pain Level (on 0 to 10) scale:   0  / 10     ASSESSMENT  Assessment/Changes in Function:     See POC     []  See Progress Note/Recertification   Patient will continue to benefit from skilled PT services to modify and progress therapeutic interventions, address functional mobility deficits, address ROM deficits, address strength deficits, analyze and address soft tissue restrictions and analyze and cue movement patterns to attain remaining goals. Progress toward goals / Updated goals:    See POC     PLAN  []  Upgrade activities as tolerated yes Continue plan of care   []  Discharge due to :    []  Other:      Therapist: Ventura Adrian. Juan José Sr, PT    Date: 3/30/2018 Time: 7:59 AM     Future Appointments  Date Time Provider Jenni Galvan   3/30/2018 11:30 AM Bhumi Sr, [address] [facility]

## 2018-04-17 ENCOUNTER — APPOINTMENT (OUTPATIENT)
Dept: PHYSICAL THERAPY | Age: 63
End: 2018-04-17
Payer: MEDICAID

## 2018-04-19 ENCOUNTER — APPOINTMENT (OUTPATIENT)
Dept: PHYSICAL THERAPY | Age: 63
End: 2018-04-19
Payer: MEDICAID

## 2018-04-24 ENCOUNTER — APPOINTMENT (OUTPATIENT)
Dept: PHYSICAL THERAPY | Age: 63
End: 2018-04-24
Payer: MEDICAID

## 2018-04-26 ENCOUNTER — HOSPITAL ENCOUNTER (OUTPATIENT)
Dept: PHYSICAL THERAPY | Age: 63
Discharge: HOME | End: 2018-04-26
Payer: MEDICAID

## 2018-04-26 ENCOUNTER — APPOINTMENT (OUTPATIENT)
Dept: PHYSICAL THERAPY | Age: 63
End: 2018-04-26
Payer: MEDICAID

## 2018-04-26 PROCEDURE — G8979 MOBILITY GOAL STATUS: HCPCS

## 2018-04-26 PROCEDURE — 97140 MANUAL THERAPY 1/> REGIONS: CPT

## 2018-04-26 PROCEDURE — G8978 MOBILITY CURRENT STATUS: HCPCS

## 2018-04-26 PROCEDURE — 97110 THERAPEUTIC EXERCISES: CPT

## 2018-04-26 NOTE — PROGRESS NOTES
PHYSICAL THERAPY - DAILY TREATMENT NOTE    Patient Name: Yudith Luevano        Date: 2018  : 1955   yes Patient  Verified  Visit #:   2     Insurance: Payor: [employer] MEDICAID / Plan: [facility] / Product Type: Managed Care Medicaid /      In time:  Out time:    Total Treatment Time: 63     Medicare Time Tracking (below)   Total Timed Codes (min):  53 1:1 Treatment Time:  45     TREATMENT AREA =  Low back pain [M54.5]    SUBJECTIVE  Pain Level (on 0 to 10 scale):  0  / 10   Medication Changes/New allergies or changes in medical history, any new surgeries or procedures?    no  If yes, update Summary List   Subjective Functional Status/Changes:  []  No changes reported     See PN          OBJECTIVE  Modalities Rationale:     decrease inflammation and decrease pain to improve patient's ability to perform functional mobility/ADLs and attain goals. min [] Estim, type/location:                                      []  att     []  unatt     []  w/US     []  w/ice    []  w/heat    min []  Mechanical Traction: type/lbs                   []  pro   []  sup   []  int   []  cont    []  before manual    []  after manual    min []  Ultrasound, settings/location:      min []  Iontophoresis w/ dexamethasone, location:                                               []  take home patch       []  in clinic   10 min [x]  Ice     []  Heat    location/position: R hip; reclined     min []  Vasopneumatic Device, press/temp:     min []  Other:    [] Skin assessment post-treatment (if applicable):    []  intact    []  redness- no adverse reaction     []redness  adverse reaction:        37  (45) min Therapeutic Exercise:  [x]  See flow sheet   Rationale:      increase ROM and increase strength to improve the patients ability to perform functional mobility/ADLs and attain goals. 8 min Manual Therapy: STM R GM, ITB.    Rationale:      decrease pain, increase ROM, increase tissue extensibility and decrease trigger points to improve patient's ability to perform functional mobility/ADLs and attain goals. min Patient Education:  yes  Reviewed HEP   []  Progressed/Changed HEP based on: Other Objective/Functional Measures:    -new exercises added as per flow sheet with vc's provided for form/technique 100% of the time. Post Treatment Pain Level (on 0 to 10) scale:   0  / 10     ASSESSMENT  Assessment/Changes in Function:     See PN     []  See Progress Note/Recertification   Patient will continue to benefit from skilled PT services to modify and progress therapeutic interventions, address functional mobility deficits, address ROM deficits, address strength deficits, analyze and address soft tissue restrictions and analyze and cue movement patterns to attain remaining goals. Progress toward goals / Updated goals:    See PN     PLAN  []  Upgrade activities as tolerated yes Continue plan of care   []  Discharge due to :    []  Other:      Therapist: Michaela Toledo. Tabitha PT    Date: 4/26/2018 Time: 8:01 AM     No future appointments.

## 2018-04-26 NOTE — PROGRESS NOTES
[address] PHYSICAL THERAPY  [address] [place] [address], [place] [address],[facility], 70 [facility] - Phone: [phone]  Fax: [phone] PHYSICAL THERAPY          Patient Name: Grant Thomas : 1955   Treatment/Medical Diagnosis: Low back pain [M54.5]   Onset Date:     Referral Source: Casper Langley MD Start of Care [facility]): 3/30/2018   Prior Hospitalization: See Medical History Provider #: [id number]   Prior Level of Function: Limited walking tolerance, household chores due to back pain; recreational exercise: outdoor bike riding   Comorbidities: HIV, Hepatitis C, arthritis, lumbar HNP, HTN, depression   Medications: Verified on Patient Summary List   Visits from [facility]: 2 Missed Visits: 2     Goal/Measure of Progress Goal Met? 1. Pt will be educated in appropriate HEP to improve LE strength/stability for amb, standing, and transfers. Status at last Eval:  Current Status: Yes; today Yes and ongoin   2. Patient will increase right glute med strength by 1/3 MMT to promote decreased pain with walking. Status at last Eval: R 3-*/5, L 4+/5 Current Status: R 2*/5, L 4/5 no   3. Pt will sit to stand from standard height chair x 5 reps without use of UE's. Status at last Eval: unable Current Status: 3 sets of 5 from raised mat no     Key Functional Changes/Progress: Pt has not attended PT since IE due to reported death in the family and difficulty with childcare for grandson. Pt reports average pain 2-3/10, least pain= 0/10. Max pain= 5/10 with prolonged walking (pain to LB and R hip). Current objective impairments: MMT: hip flex 4/5, Ext 3/5, ABD R 2/5, L 4/5. Impaired flexibility to posterior chain b/l. Hypertonicity and TTP to b/l thoraco-lumbar paraspinals, R psoas, QL, GM, ITB. Current functional limitations standing tolerance~ 45 minutes, walking, sit to stand.     Problem List: pain affecting function, decrease ROM, decrease strength, impaired gait/ balance, decrease ADL/ functional abilitiies, decrease activity tolerance, decrease flexibility/ joint mobility and decrease transfer abilities   Treatment Plan may include any combination of the following: Therapeutic exercise, Therapeutic activities, Neuromuscular re-education, Physical agent/modality, Gait/balance training, Manual therapy, Patient education and Functional mobility training  Patient Goal(s) has been updated and includes:      Goals for this certification period include and are to be achieved in   4-6  weeks:  1. Pt will increase walking tolerance to >/= 30 minutes in order to improve ease with grocery shopping. 2. Pt will achieve >/= +4 GROC in order to promote increased activity tolerance. 3. Pt will demonstrate neutral spine with performing supine stability exercises for improved stability with daily tasks. 4. Pt will demonstrate bridge to 50% in order to improve axial stability for prolonged standing. Frequency / Duration:   Patient to be seen   2   times per week for   4-6    weeks:  G-Codes (GP): Mobility [id number] Current  CK= 40-59%   Goal  CK= 40-59%. The severity rating is based on the FOTO Score    Assessments/Recommendations: Pt has been unable to participate consistently with PT due to family issues over the past month. She has been educated on attendance policy and need for consistent attendance and HEP performance in order to benefit and promote goal attainment. She verbalizes understanding and agreement. Will plan for continued PT at 2 x/wk for additional 4 weeks. If you have any questions/comments please contact us directly at [phone]. Thank you for allowing us to assist in the care of your patient. Therapist Signature: Richard Lu PT Date: 6/45/3806   Certification Period:  Reporting Period: 03/30/18 to 6/28/2018  3/30/2018 to 04/26/18  Time: 10:37 AM   NOTE TO PHYSICIAN:  PLEASE COMPLETE THE ORDERS BELOW AND FAX TO   [facility] Physical Therapy: [phone]  If you are unable to process this request in 24 hours please contact our office: [phone]    ___ I have read the above report and request that my patient continue as recommended.   ___ I have read the above report and request that my patient continue therapy with the following changes/special instructions: ________________________________________________   ___ I have read the above report and request that my patient be discharged from therapy.      Physician Signature:        Date:       Time:

## 2018-05-01 ENCOUNTER — APPOINTMENT (OUTPATIENT)
Dept: PHYSICAL THERAPY | Age: 63
End: 2018-05-01

## 2018-05-03 ENCOUNTER — APPOINTMENT (OUTPATIENT)
Dept: PHYSICAL THERAPY | Age: 63
End: 2018-05-03

## 2018-05-10 NOTE — PROGRESS NOTES
[address]avier [address]  [facility] THERAPY  [address] [place], [place] [address],[facility], 70 [facility] - Phone: [phone]  Fax: [phone]    DISCHARGE NOTE  Patient Name: Eva Lee : 1955   Treatment/Medical Diagnosis: Low back pain [M54.5]   Referral Source: Beny Nix MD     Date of Initial Visit: 3/30/2018 Attended Visits: 2 Missed Visits: 5       SUMMARY OF TREATMENT  Pt attended only initial evaluation and     1     follow-ups and then did not return. Pt was assessed as of last attended session on 18 due to 30 days since last session, however did not return afterwards. RECOMMENDATIONS  Discontinue physical therapy due to patient not returning and non-compliance with attendance policy. If you have any questions/comments please contact us directly at [phone]. Thank you for allowing us to assist in the care of your patient. Therapist Signature: Fredy Hanna.  VU Lu Date: 05/10/18      Time: 7:58 AM

## 2018-05-11 ENCOUNTER — APPOINTMENT (OUTPATIENT)
Dept: PHYSICAL THERAPY | Age: 63
End: 2018-05-11

## 2018-05-16 ENCOUNTER — APPOINTMENT (OUTPATIENT)
Dept: PHYSICAL THERAPY | Age: 63
End: 2018-05-16

## 2018-05-18 ENCOUNTER — APPOINTMENT (OUTPATIENT)
Dept: PHYSICAL THERAPY | Age: 63
End: 2018-05-18

## 2018-05-23 ENCOUNTER — APPOINTMENT (OUTPATIENT)
Dept: PHYSICAL THERAPY | Age: 63
End: 2018-05-23

## 2018-05-25 ENCOUNTER — APPOINTMENT (OUTPATIENT)
Dept: PHYSICAL THERAPY | Age: 63
End: 2018-05-25

## 2018-10-08 ENCOUNTER — OFFICE VISIT (OUTPATIENT)
Dept: ORTHOPEDIC SURGERY | Facility: CLINIC | Age: 63
End: 2018-10-08

## 2018-10-08 VITALS
BODY MASS INDEX: 30.37 KG/M2 | RESPIRATION RATE: 16 BRPM | SYSTOLIC BLOOD PRESSURE: 136 MMHG | TEMPERATURE: 95.5 F | DIASTOLIC BLOOD PRESSURE: 89 MMHG | HEIGHT: 66 IN | OXYGEN SATURATION: 94 % | WEIGHT: 189 LBS | HEART RATE: 76 BPM

## 2018-10-08 DIAGNOSIS — M25.561 CHRONIC PAIN OF RIGHT KNEE: ICD-10-CM

## 2018-10-08 DIAGNOSIS — F32.A DEPRESSION, UNSPECIFIED DEPRESSION TYPE: Chronic | ICD-10-CM

## 2018-10-08 DIAGNOSIS — G89.29 CHRONIC PAIN OF RIGHT KNEE: ICD-10-CM

## 2018-10-08 DIAGNOSIS — M17.11 PRIMARY OSTEOARTHRITIS OF RIGHT KNEE: Primary | ICD-10-CM

## 2018-10-08 DIAGNOSIS — Z21 HIV POSITIVE (HCC): ICD-10-CM

## 2018-10-08 RX ORDER — BUPIVACAINE HYDROCHLORIDE 2.5 MG/ML
4 INJECTION, SOLUTION EPIDURAL; INFILTRATION; INTRACAUDAL ONCE
Qty: 4 ML | Refills: 0
Start: 2018-10-08 | End: 2018-10-08

## 2018-10-08 RX ORDER — BETAMETHASONE SODIUM PHOSPHATE AND BETAMETHASONE ACETATE 3; 3 MG/ML; MG/ML
6 INJECTION, SUSPENSION INTRA-ARTICULAR; INTRALESIONAL; INTRAMUSCULAR; SOFT TISSUE ONCE
Qty: 0.5 ML | Refills: 0
Start: 2018-10-08 | End: 2018-10-08

## 2018-10-08 NOTE — PROGRESS NOTES
Patient: David Christian                MRN: [id number]       SSN: [id number]  YOB: 1955        AGE: 59 y.o. SEX: female    PCP: Danis Hanna MD  10/08/18    Chief Complaint   Patient presents with    Knee Pain     Right knee pain     HISTORY:  David Christian is a 59 y.o. female who is seen for right knee pain. She has been experiencing knee pain for the past few years that has increased recently. She reports no recent injury. She has mostly medial right knee pain. She reports tenderness to light touch over the medial aspect of her right knee. She had previously had a medial collateral ligament repair following a high school injury her right knee while competing in the long jump. She notes pain with standing walking and sitting. She was previously seen by Dr. Hanny Moreland for back pain. She has been seen by the spine center. Pain Assessment  10/8/2018   Location of Pain Knee   Location Modifiers Right   Severity of Pain 7   Quality of Pain Throbbing;Aching;Grinding; Popping   Quality of Pain Comment Tender to touch; stiffness upon getting up   Duration of Pain Persistent   Frequency of Pain Constant   Aggravating Factors Standing;Walking;Bending   Aggravating Factors Comment -   Limiting Behavior No   Relieving Factors Rest   Relieving Factors Comment -   Result of Injury No     Occupation, etc:  Ms. Victorino Segura  receives social security disability benefits for HIV, major depression and thyroidectomy. she has been receiving treatment for her depression. She was previously suicidal. She had changed her lifestyle and been more happy recently. She lives alone in [place]. She is accompanied by her  who takes her shopping and keeps her busy. She has 3 children in the [employer]. Her daughter is in [place], her son just made chief, and her other son is in the area. She has one grand daughter Saintclair Junior and 2 grand sons. She is needle phobic.   She states she has gained 50 pounds in the past year. Current weight is 189 pounds. She is 5'6\" tall. No results found for: HBA1C, HGBE8, FJO2SFJJ, OAZ8VTDK, LIY2MMVQ  Weight Metrics 10/8/2018 1/2/2018 12/11/2017 12/1/2017 10/30/2017 9/26/2017 4/28/2017   Weight 189 lb 201 lb 12.8 oz 202 lb 197 lb 4 oz 200 lb 200 lb 199 lb 6.4 oz   BMI 30.51 kg/m2 32.57 kg/m2 32.6 kg/m2 31.84 kg/m2 31.32 kg/m2 31.32 kg/m2 32.18 kg/m2   Some encounter information is confidential and restricted. Go to Review Flowsheets activity to see all data. 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 Cardiovascular: negative for chest pain, claudication, leg swelling, SOB, WEIN[place]G   Gastrointestinal: Negative for pain, N/V/C/D, Blood in stool or urine, dysuria,  hematuria, incontinence, pelvic pain. Musculoskeletal: See HPI   Neurological: Negative for dizziness and weakness. Negative for headaches, Visual changes, confusion, seizures   Phychiatric/Behavioral: Negative for depression, memory loss, substance  abuse. Extremities: Negative for hair changes, rash, or skin lesion changes. Hematologic: Negative for bleeding problems, bruising, pallor or swollen lymph  nodes   Peripheral Vascular: No calf pain, no circulation deficits. Social History     Social History    Marital status:      Spouse name: N/A    Number of children: N/A    Years of education: N/A     Occupational History    Not on file.      Social History Main Topics    Smoking status: Never Smoker    Smokeless tobacco: Never Used    Alcohol use Yes      Comment: occasionally    Drug use: Yes     Special: Cocaine, Marijuana Comment: hx of cocaine use in july, last use of marijuana in august    Sexual activity: Not on file     Other Topics Concern    Not on file     Social History Narrative      No Known Allergies   Current Outpatient Prescriptions   Medication Sig    betamethasone (CELESTONE SOLUSPAN) 6 mg/mL injection 1 mL by Intra artICUlar route once for 1 dose.  bupivacaine, PF, (MARCAINE, PF,) 0.25 % (2.5 mg/mL) injection 4 mL by Intra artICUlar route once for 1 dose.  hydrocortisone (PROCTOSOL HC) 2.5 % rectal cream Insert  into rectum three (3) times daily as needed for Hemorrhoids.  albuterol (PROVENTIL HFA, VENTOLIN HFA, PROAIR HFA) 90 mcg/actuation inhaler Take 2 Puffs by inhalation every four (4) hours as needed.  DESCOVY tablet     lisinopril (PRINIVIL, ZESTRIL) 10 mg tablet take 1 tablet by mouth once daily    hydroCHLOROthiazide (MICROZIDE) 12.5 mg capsule 25 mg.    amLODIPine (NORVASC) 10 mg tablet Take 10 mg by mouth daily.  levothyroxine (SYNTHROID) 100 mcg tablet Take 100 mcg by mouth Daily (before breakfast).  oxyCODONE-acetaminophen (PERCOCET) 5-325 mg per tablet Take 1 Tab by mouth every four (4) hours as needed for Pain. Max Daily Amount: 6 Tabs.  amitriptyline (ELAVIL) 25 mg tablet take 1 tablet by mouth at bedtime    ritonavir (NORVIR) 100 mg tablet Norvir 100 MG Oral Tablet  TAKE 1 TABLET DAILY   Quantity: 30;  Refills: 5       CAILIN TOLBERT P.A.;  Started 25-Aug-2010  Active    atazanavir (REYATAZ) 300 mg capsule Take 300 mg by mouth daily. No current facility-administered medications for this visit.        PHYSICAL EXAMINATION:  Visit Vitals    /89 (BP 1 Location: Left arm, BP Patient Position: Sitting)    Pulse 76    Temp 95.5 °F (35.3 °C) (Oral)    Resp 16    Ht 5' 6\" (1.676 m)    Wt 189 lb (85.7 kg)    SpO2 94%    BMI 30.51 kg/m2      PHYSICAL EXAM:  Visit Vitals    /89 (BP 1 Location: Left arm, BP Patient Position: Sitting)    Pulse 76    Temp 95.5 °F (35.3 °C) (Oral)    Resp 16    Ht 5' 6\" (1.676 m)    Wt 189 lb (85.7 kg)    SpO2 94%    BMI 30.51 kg/m2       Appearance: Alert, well appearing and pleasant patient who is in no distress, oriented to person, place/time, and who follows commands. HEENT: David Christian hears well, does not require hearing aids. Her sclera of the eyes are non-icteric. She is breathing normally and no respiratory accessory muscle use is noted. No JVD present and Neck ROM within normal limits. Psychiatric: Affect and mood are appropriate. Oriented x3  Heart[de-identified]  S1, S2 without murmer, regular rhythm  Lungs:  Breath sounds clear to auscultation  Cardiovascular/Peripheral Vascular: Normal pulses to each foot. Integumentary: No rashes,  wounds, or abrasions. Warm and normal color. No drainage. Gait: antalgic  Sensory Exam: Intact/Normal Sensation    Lymphatic: No evidence of Lymphedema  Vascular:       Pulses: palpable  Varicosities none  Wounds/Abrasion: None Present  Neuro: Negative, no tremors  ORTHO EXAMINATION:  Examination Right knee Left knee   Skin Intact Intact   Range of motion 100-0 120-0   Effusion - -   Medial joint line tenderness ++ -   Lateral joint line tenderness - -   Popliteal tenderness - -   Osteophytes palpable +medial -   Erickas - -   Patella crepitus + -   Anterior drawer - -   Lateral laxity - -   Medial laxity - -   Varus deformity - -   Valgus deformity - -   Pretibial edema - -   Calf tenderness - -     PROCEDURE:  After discussing treatment options, patient's right knee was injected with 4 cc Marcaine and 1/2 cc Celestone.     Chart reviewed for the following:   Rosalba Reynoso MD, have reviewed the History, Physical and updated the Allergic reactions for [address] performed immediately prior to start of procedure:  IGirish MD, have performed the following reviews on David Christian prior to the start of the procedure:            * Patient was identified by name and date of birth   * Agreement on procedure being performed was verified  * Risks and Benefits explained to the patient  * Procedure site verified and marked as necessary  * Patient was positioned for comfort  * Consent was obtained     Time: 2:00 PM     Date of procedure: 10/8/2018    Procedure performed by:  Sandeep Nielson MD    Ms. Lara tolerated the procedure well with no complications. RADIOGRAPHS:  XR RIGHT KNEE 10/8/18 NIKKI   IMPRESSION:  Three views - No fractures, no effusion, severe medial joint space narrowing, + osteophytes present. IKDC Grade D    IMPRESSION:      ICD-10-CM ICD-9-CM    1. Primary osteoarthritis of right knee M17.11 715.16 AMB SUPPLY ORDER      PROCEDURE AUTHORIZATION TO       betamethasone (CELESTONE SOLUSPAN) 6 mg/mL injection      BETAMETHASONE ACETATE & SODIUM PHOSPHATE INJECTION 3 MG EA.      DRAIN/INJECT LARGE JOINT/BURSA      bupivacaine, PF, (MARCAINE, PF,) 0.25 % (2.5 mg/mL) injection   2. Chronic pain of right knee M25.561 719.46 AMB POC X-RAY KNEE 3 VIEW    G89.29 338.29 AMB SUPPLY ORDER      PROCEDURE AUTHORIZATION TO       betamethasone (CELESTONE SOLUSPAN) 6 mg/mL injection      BETAMETHASONE ACETATE & SODIUM PHOSPHATE INJECTION 3 MG EA.      DRAIN/INJECT LARGE JOINT/BURSA      bupivacaine, PF, (MARCAINE, PF,) 0.25 % (2.5 mg/mL) injection   3. HIV positive ([facility] Z21 V08    4. depression F32.9 311      PLAN:  After discussing treatment options, patient's right knee was injected with 4 cc Marcaine and 1/2 cc Celestone. She will follow up as needed. Consider visco supplementation if pain continues.       Scribed by Philip David ([address]) as dictated by Sandeep Nielson MD

## 2018-10-08 NOTE — PATIENT INSTRUCTIONS
Knee Arthritis: Care Instructions  Your Care Instructions    Knee arthritis is a breakdown of the cartilage that cushions your knee joint. When the cartilage wears down, your bones rub against each other. This causes pain and stiffness. Knee arthritis tends to get worse with time. Treatment for knee arthritis involves reducing pain, making the leg muscles stronger, and staying at a healthy body weight. The treatment usually does not improve the health of the cartilage, but it can reduce pain and improve how well your knee works. You can take simple measures to protect your knee joints, ease your pain, and help you stay active. Follow-up care is a key part of your treatment and safety. Be sure to make and go to all appointments, and call your doctor if you are having problems. It's also a good idea to know your test results and keep a list of the medicines you take. How can you care for yourself at home? · Know that knee arthritis will cause more pain on some days than on others. · Stay at a healthy weight. Lose weight if you are overweight. When you stand up, the pressure on your knees from every pound of body weight is multiplied four times. So if you lose 10 pounds, you will reduce the pressure on your knees by 40 pounds. · Talk to your doctor or physical therapist about exercises that will help ease joint pain. ¨ Stretch to help prevent stiffness and to prevent injury before you exercise. You may enjoy gentle forms of yoga to help keep your knee joints and muscles flexible. ¨ Walk instead of jog. ¨ Ride a bike. This makes your thigh muscles stronger and takes pressure off your knee. ¨ Wear well-fitting and comfortable shoes. ¨ Exercise in chest-deep water. This can help you exercise longer with less pain. ¨ Avoid exercises that include squatting or kneeling. They can put a lot of strain on your knees.   ¨ Talk to your doctor to make sure that the exercise you do is not making the arthritis worse.  · Do not sit for long periods of time. Try to walk once in a while to keep your knee from getting stiff. · Ask your doctor or physical therapist whether shoe inserts may reduce your arthritis pain. · If you can afford it, get new athletic shoes at least every year. This can help reduce the strain on your knees. · Use a device to help you do everyday activities. ¨ A cane or walking stick can help you keep your balance when you walk. Hold the cane or walking stick in the hand opposite the painful knee. ¨ If you feel like you may fall when you walk, try using crutches or a front-wheeled walker. These can prevent falls that could cause more damage to your knee. ¨ A knee brace may help keep your knee stable and prevent pain. ¨ You also can use other things to make life easier, such as a higher toilet seat and handrails in the bathtub or shower. · Take pain medicines exactly as directed. ¨ Do not wait until you are in severe pain. You will get better results if you take it sooner. ¨ If you are not taking a prescription pain medicine, take an over-the-counter medicine such as acetaminophen (Tylenol), ibuprofen (Advil, Motrin), or naproxen (Aleve). Read and follow all instructions on the label. ¨ Do not take two or more pain medicines at the same time unless the doctor told you to. Many pain medicines have acetaminophen, which is Tylenol. Too much acetaminophen (Tylenol) can be harmful. ¨ Tell your doctor if you take a blood thinner, have diabetes, or have allergies to shellfish. · Ask your doctor if you might benefit from a shot of steroid medicine into your knee. This may provide pain relief for several months. · Many people take the supplements glucosamine and chondroitin for osteoarthritis. Some people feel they help, but the medical research does not show that they work. Talk to your doctor before you take these supplements. When should you call for help?   Call your doctor now or seek immediate medical care if:    · You have sudden swelling, warmth, or pain in your knee.     · You have knee pain and a fever or rash.     · You have such bad pain that you cannot use your knee.    Watch closely for changes in your health, and be sure to contact your doctor if you have any problems. Where can you learn more? Go to http://mouna-noris.info/. Enter D668 in the search box to learn more about \"Knee Arthritis: Care Instructions. \"  Current as of: June 11, 2018  Content Version: 11.8  © 9481-1218 [employer]. Care instructions adapted under license by [employer] (which disclaims liability or warranty for this information). If you have questions about a medical condition or this instruction, always ask your healthcare professional. [address] any warranty or liability for your use of this information. Knee Arthritis: Exercises  Your Care Instructions  Here are some examples of exercises for knee arthritis. Start each exercise slowly. Ease off the exercise if you start to have pain. Your doctor or physical therapist will tell you when you can start these exercises and which ones will work best for you. How to do the exercises  Knee flexion with heel slide    1. Lie on your back with your knees bent. 2. Slide your heel back by bending your affected knee as far as you can. Then hook your other foot around your ankle to help pull your heel even farther back. 3. Hold for about 6 seconds, then rest for up to 10 seconds. 4. Repeat 8 to 12 times. 5. Switch legs and repeat steps 1 through 4, even if only one knee is sore. Quad sets    1. Sit with your affected leg straight and supported on the floor or a firm bed. Place a small, rolled-up towel under your knee. Your other leg should be bent, with that foot flat on the floor. 2. Tighten the thigh muscles of your affected leg by pressing the back of your knee down into the towel.   3. Hold for about 6 seconds, then rest for up to 10 seconds. 4. Repeat 8 to 12 times. 5. Switch legs and repeat steps 1 through 4, even if only one knee is sore. Straight-leg raises to the front    1. Lie on your back with your good knee bent so that your foot rests flat on the floor. Your affected leg should be straight. Make sure that your low back has a normal curve. You should be able to slip your hand in between the floor and the small of your back, with your palm touching the floor and your back touching the back of your hand. 2. Tighten the thigh muscles in your affected leg by pressing the back of your knee flat down to the floor. Hold your knee straight. 3. Keeping the thigh muscles tight and your leg straight, lift your affected leg up so that your heel is about 12 inches off the floor. Hold for about 6 seconds, then lower slowly. 4. Relax for up to 10 seconds between repetitions. 5. Repeat 8 to 12 times. 6. Switch legs and repeat steps 1 through 5, even if only one knee is sore. Active knee flexion    1. Lie on your stomach with your knees straight. If your kneecap is uncomfortable, roll up a washcloth and put it under your leg just above your kneecap. 2. Lift the foot of your affected leg by bending the knee so that you bring the foot up toward your buttock. If this motion hurts, try it without bending your knee quite as far. This may help you avoid any painful motion. 3. Slowly move your leg up and down. 4. Repeat 8 to 12 times. 5. Switch legs and repeat steps 1 through 4, even if only one knee is sore. Quadriceps stretch (facedown)    1. Lie flat on your stomach, and rest your face on the floor. 2. Wrap a towel or belt strap around the lower part of your affected leg. Then use the towel or belt strap to slowly pull your heel toward your buttock until you feel a stretch. 3. Hold for about 15 to 30 seconds, then relax your leg against the towel or belt strap. 4. Repeat 2 to 4 times.   5. Switch legs and repeat steps 1 through 4, even if only one knee is sore. Stationary exercise bike    1. If you do not have a stationary exercise bike at home, you can find one to ride at your local health club or community center. 2. Adjust the height of the bike seat so that your knee is slightly bent when your leg is extended downward. If your knee hurts when the pedal reaches the top, you can raise the seat so that your knee does not bend as much. 3. Start slowly. At first, try to do 5 to 10 minutes of cycling with little to no resistance. Then increase your time and the resistance bit by bit until you can do 20 to 30 minutes without pain. 4. If you start to have pain, rest your knee until your pain gets back to the level that is normal for you. Or cycle for less time or with less effort. Follow-up care is a key part of your treatment and safety. Be sure to make and go to all appointments, and call your doctor if you are having problems. It's also a good idea to know your test results and keep a list of the medicines you take. Where can you learn more? Go to http://mouna-noris.info/. Enter C159 in the search box to learn more about \"Knee Arthritis: Exercises. \"  Current as of: November 29, 2017  Content Version: 11.8  © 7789-3454 [employer]. Care instructions adapted under license by [employer] (which disclaims liability or warranty for this information). If you have questions about a medical condition or this instruction, always ask your healthcare professional. [place] [address] any warranty or liability for your use of this information. Learning About Joint Injections  What are joint injections? Joint injections are shots into a joint, such as the knee or shoulder. They are used to put in medicines, such as pain relievers and steroid medicines. Steroids can be injected directly into a swollen and painful joint to reduce inflammation.  A steroid shot can sometimes help with short-term pain relief when other treatments haven't worked. If steroid shots help, pain may improve for weeks or months. How are they done? First, the area over the joint will be cleaned. Your doctor may then use a tiny needle to numb the skin in the area where you will get the joint injection. If a tiny needle is used to numb the area, your doctor will use another needle to inject the medicine. Your doctor may use a pain reliever, a steroid, or both. You may feel some pressure or discomfort. The procedure takes 10 to 30 minutes. But the injection itself usually takes only a few minutes. Your doctor may put ice on the area before you go home. You will probably go home soon after your shot. What can you expect after a joint injection? You may have numbness around the joint for a few hours. If your shot included both a pain reliever and a steroid, then the pain will probably go away right away. But it might come back after a few hours. This might happen if the pain reliever wears off and the steroid hasn't started to work yet. Steroids don't always work. But when they do, the pain relief can last for several days to a few months or longer. Your doctor may tell you to use ice on the area. You can also use ice if the pain comes back. Put ice or a cold pack on your joint for 10 to 20 minutes at a time. Put a thin cloth between the ice and your skin. Follow your doctor's instructions carefully. Follow-up care is a key part of your treatment and safety. Be sure to make and go to all appointments, and call your doctor if you are having problems. It's also a good idea to know your test results and keep a list of the medicines you take. Where can you learn more? Go to http://mouna-noris.info/. Enter X471 in the search box to learn more about \"Learning About Joint Injections. \"  Current as of: September 14, 2017  Content Version: 11.8  © 8864-2549 [employer]. Care instructions adapted under license by [employer] (which disclaims liability or warranty for this information). If you have questions about a medical condition or this instruction, always ask your healthcare professional. [address] any warranty or liability for your use of this information.

## 2018-10-08 NOTE — MR AVS SNAPSHOT
[address] [facility] 
 
 
 [address] [facility], [address] [facility] [address] 
[phone] Patient: Anjum Johnston MRN: [id number] AJY:[birth year] Visit Information Date & Time Provider Department Dept. Phone Encounter #  
 10/8/2018  3:00 PM Eryn Padron MD [facility] - [facility] 85 [phone] Follow-up Instructions Return if symptoms worsen or fail to improve. Your Appointments 10/8/2018  3:00 PM  
New Patient with Eryn Padron MD  
[address] [facility], [address] [facility] - [facility] [address] [facility] [facility]) Appt Note: rt knee pain, former [facility] pt, sched by SEGUNDO, adv HS office [address] [facility] [address], [address] [facility] [address]  
[phone]  
  
   
 [address] [place] [address], [address] [place] [address] Jose [address] Upcoming Health Maintenance Date Due Hepatitis C Screening 1955 Shingrix Vaccine Age 50> (1 of 2) 5/6/2006 BREAST CANCER SCRN MAMMOGRAM 5/6/2006 FOBT Q 1 YEAR AGE 50-75 5/6/2006 PAP AKA CERVICAL CYTOLOGY 2/14/2015 Influenza Age 5 to Adult 8/1/2018 DTaP/Tdap/Td series (2 - Td) 10/31/2022 Allergies as of 10/8/2018  Review Complete On: 10/8/2018 By: Ken Osei LPN No Known Allergies Current Immunizations  Never Reviewed Name Date TDAP Vaccine 10/31/2012 10:36 AM  
  
 Not reviewed this visit You Were Diagnosed With   
  
 Codes Comments Primary osteoarthritis of right knee    -  Primary ICD-10-CM: M17.11 ICD-9-CM: 715.16 Chronic pain of right knee     ICD-10-CM: M25.561, G89.29 ICD-9-CM: 719.46, 338.29 Vitals BP Pulse Temp Resp Height(growth percentile) Weight(growth percentile) 136/89 (BP 1 Location: Left arm, BP Patient Position: Sitting) 76 95.5 °F (35.3 °C) (Oral) 16 5' 6\" (1.676 m) 189 lb (85.7 kg) SpO2 BMI OB Status Smoking Status 94% 30.51 kg/m2 Hysterectomy Never Smoker BMI and BSA Data Body Mass Index Body Surface Area 30.51 kg/m 2 2 m 2 Preferred Pharmacy Pharmacy Name Phone [facility], [place] [address] [phone] Your Updated Medication List  
  
   
This list is accurate as of 10/8/18  2:01 PM.  Always use your most recent med list.  
  
  
  
  
 albuterol 90 mcg/actuation inhaler Commonly known as:  PROVENTIL HFA, VENTOLIN HFA, PROAIR HFA Take 2 Puffs by inhalation every four (4) hours as needed. amitriptyline 25 mg tablet Commonly known as:  ELAVIL  
take 1 tablet by mouth at bedtime  
  
 amLODIPine 10 mg tablet Commonly known as:  Pasty Pall Take 10 mg by mouth daily. DESCOVY tablet Generic drug:  emtricitabine-tenofovir alafen  
  
 hydroCHLOROthiazide 12.5 mg capsule Commonly known as:  MICROZIDE  
25 mg.  
  
 hydrocortisone 2.5 % rectal cream  
Commonly known as:  PROCTOSOL HC Insert  into rectum three (3) times daily as needed for Hemorrhoids. levothyroxine 100 mcg tablet Commonly known as:  SYNTHROID Take 100 mcg by mouth Daily (before breakfast). lisinopril 10 mg tablet Commonly known as:  PRINIVIL, ZESTRIL  
take 1 tablet by mouth once daily NORVIR 100 mg tablet Generic drug:  ritonavir Norvir 100 MG Oral Tablet TAKE 1 TABLET DAILY  Quantity: 30;  Refills: 5    CAILIN TOLBERT;  Started 25-Aug-2010 Active  
  
 oxyCODONE-acetaminophen 5-325 mg per tablet Commonly known as:  PERCOCET Take 1 Tab by mouth every four (4) hours as needed for Pain. Max Daily Amount: 6 Tabs. REYATAZ 300 mg capsule Generic drug:  atazanavir Take 300 mg by mouth daily. We Performed the Following AMB POC X-RAY KNEE 3 VIEW [63069 CPT(R)] Follow-up Instructions Return if symptoms worsen or fail to improve. Patient Instructions Knee Arthritis: Care Instructions Your Care Instructions Knee arthritis is a breakdown of the cartilage that cushions your knee joint. When the cartilage wears down, your bones rub against each other. This causes pain and stiffness. Knee arthritis tends to get worse with time. Treatment for knee arthritis involves reducing pain, making the leg muscles stronger, and staying at a healthy body weight. The treatment usually does not improve the health of the cartilage, but it can reduce pain and improve how well your knee works. You can take simple measures to protect your knee joints, ease your pain, and help you stay active. Follow-up care is a key part of your treatment and safety. Be sure to make and go to all appointments, and call your doctor if you are having problems. It's also a good idea to know your test results and keep a list of the medicines you take. How can you care for yourself at home? · Know that knee arthritis will cause more pain on some days than on others. · Stay at a healthy weight. Lose weight if you are overweight. When you stand up, the pressure on your knees from every pound of body weight is multiplied four times. So if you lose 10 pounds, you will reduce the pressure on your knees by 40 pounds. · Talk to your doctor or physical therapist about exercises that will help ease joint pain. ¨ Stretch to help prevent stiffness and to prevent injury before you exercise. You may enjoy gentle forms of yoga to help keep your knee joints and muscles flexible. ¨ Walk instead of jog. ¨ Ride a bike. This makes your thigh muscles stronger and takes pressure off your knee. ¨ Wear well-fitting and comfortable shoes. ¨ Exercise in chest-deep water. This can help you exercise longer with less pain. ¨ Avoid exercises that include squatting or kneeling. They can put a lot of strain on your knees. ¨ Talk to your doctor to make sure that the exercise you do is not making the arthritis worse. · Do not sit for long periods of time. Try to walk once in a while to keep your knee from getting stiff. · Ask your doctor or physical therapist whether shoe inserts may reduce your arthritis pain. · If you can afford it, get new athletic shoes at least every year. This can help reduce the strain on your knees. · Use a device to help you do everyday activities. ¨ A cane or walking stick can help you keep your balance when you walk. Hold the cane or walking stick in the hand opposite the painful knee. ¨ If you feel like you may fall when you walk, try using crutches or a front-wheeled walker. These can prevent falls that could cause more damage to your knee. ¨ A knee brace may help keep your knee stable and prevent pain. ¨ You also can use other things to make life easier, such as a higher toilet seat and handrails in the bathtub or shower. · Take pain medicines exactly as directed. ¨ Do not wait until you are in severe pain. You will get better results if you take it sooner. ¨ If you are not taking a prescription pain medicine, take an over-the-counter medicine such as acetaminophen (Tylenol), ibuprofen (Advil, Motrin), or naproxen (Aleve). Read and follow all instructions on the label. ¨ Do not take two or more pain medicines at the same time unless the doctor told you to. Many pain medicines have acetaminophen, which is Tylenol. Too much acetaminophen (Tylenol) can be harmful. ¨ Tell your doctor if you take a blood thinner, have diabetes, or have allergies to shellfish. · Ask your doctor if you might benefit from a shot of steroid medicine into your knee. This may provide pain relief for several months. · Many people take the supplements glucosamine and chondroitin for osteoarthritis. Some people feel they help, but the medical research does not show that they work. Talk to your doctor before you take these supplements. When should you call for help? Call your doctor now or seek immediate medical care if: 
  · You have sudden swelling, warmth, or pain in your knee.  
  · You have knee pain and a fever or rash.  
  · You have such bad pain that you cannot use your knee.  
 Watch closely for changes in your health, and be sure to contact your doctor if you have any problems. Where can you learn more? Go to http://mouna-noris.info/. Enter D906 in the search box to learn more about \"Knee Arthritis: Care Instructions. \" Current as of: June 11, 2018 Content Version: 11.8 © 8552-4571 [employer]. Care instructions adapted under license by [employer] (which disclaims liability or warranty for this information). If you have questions about a medical condition or this instruction, always ask your healthcare professional. [address] any warranty or liability for your use of this information. Knee Arthritis: Exercises Your Care Instructions Here are some examples of exercises for knee arthritis. Start each exercise slowly. Ease off the exercise if you start to have pain. Your doctor or physical therapist will tell you when you can start these exercises and which ones will work best for you. How to do the exercises Knee flexion with heel slide 1. Lie on your back with your knees bent. 2. Slide your heel back by bending your affected knee as far as you can. Then hook your other foot around your ankle to help pull your heel even farther back. 3. Hold for about 6 seconds, then rest for up to 10 seconds. 4. Repeat 8 to 12 times. 5. Switch legs and repeat steps 1 through 4, even if only one knee is sore. [facility] 1. Sit with your affected leg straight and supported on the floor or a firm bed. Place a small, rolled-up towel under your knee. Your other leg should be bent, with that foot flat on the floor.  
2. Tighten the thigh muscles of your affected leg by pressing the back of your knee down into the towel. 3. Hold for about 6 seconds, then rest for up to 10 seconds. 4. Repeat 8 to 12 times. 5. Switch legs and repeat steps 1 through 4, even if only one knee is sore. Straight-leg raises to the front 1. Lie on your back with your good knee bent so that your foot rests flat on the floor. Your affected leg should be straight. Make sure that your low back has a normal curve. You should be able to slip your hand in between the floor and the small of your back, with your palm touching the floor and your back touching the back of your hand. 2. Tighten the thigh muscles in your affected leg by pressing the back of your knee flat down to the floor. Hold your knee straight. 3. Keeping the thigh muscles tight and your leg straight, lift your affected leg up so that your heel is about 12 inches off the floor. Hold for about 6 seconds, then lower slowly. 4. Relax for up to 10 seconds between repetitions. 5. Repeat 8 to 12 times. 6. Switch legs and repeat steps 1 through 5, even if only one knee is sore. Active knee flexion 1. Lie on your stomach with your knees straight. If your kneecap is uncomfortable, roll up a washcloth and put it under your leg just above your kneecap. 2. Lift the foot of your affected leg by bending the knee so that you bring the foot up toward your buttock. If this motion hurts, try it without bending your knee quite as far. This may help you avoid any painful motion. 3. Slowly move your leg up and down. 4. Repeat 8 to 12 times. 5. Switch legs and repeat steps 1 through 4, even if only one knee is sore. Quadriceps stretch (facedown) 1. Lie flat on your stomach, and rest your face on the floor. 2. Wrap a towel or belt strap around the lower part of your affected leg. Then use the towel or belt strap to slowly pull your heel toward your buttock until you feel a stretch.  
3. Hold for about 15 to 30 seconds, then relax your leg against the towel or belt strap. 4. Repeat 2 to 4 times. 5. Switch legs and repeat steps 1 through 4, even if only one knee is sore. Stationary exercise bike 1. If you do not have a stationary exercise bike at home, you can find one to ride at your local health club or community center. 2. Adjust the height of the bike seat so that your knee is slightly bent when your leg is extended downward. If your knee hurts when the pedal reaches the top, you can raise the seat so that your knee does not bend as much. 3. Start slowly. At first, try to do 5 to 10 minutes of cycling with little to no resistance. Then increase your time and the resistance bit by bit until you can do 20 to 30 minutes without pain. 4. If you start to have pain, rest your knee until your pain gets back to the level that is normal for you. Or cycle for less time or with less effort. Follow-up care is a key part of your treatment and safety. Be sure to make and go to all appointments, and call your doctor if you are having problems. It's also a good idea to know your test results and keep a list of the medicines you take. Where can you learn more? Go to http://mouna-noris.info/. Enter C159 in the search box to learn more about \"Knee Arthritis: Exercises. \" Current as of: November 29, 2017 Content Version: 11.8 © 9923-0145 [employer]. Care instructions adapted under license by [employer] (which disclaims liability or warranty for this information). If you have questions about a medical condition or this instruction, always ask your healthcare professional. [place] [address] any warranty or liability for your use of this information. Learning About Joint Injections What are joint injections? Joint injections are shots into a joint, such as the knee or shoulder. They are used to put in medicines, such as pain relievers and steroid medicines. Steroids can be injected directly into a swollen and painful joint to reduce inflammation. A steroid shot can sometimes help with short-term pain relief when other treatments haven't worked. If steroid shots help, pain may improve for weeks or months. How are they done? First, the area over the joint will be cleaned. Your doctor may then use a tiny needle to numb the skin in the area where you will get the joint injection. If a tiny needle is used to numb the area, your doctor will use another needle to inject the medicine. Your doctor may use a pain reliever, a steroid, or both. You may feel some pressure or discomfort. The procedure takes 10 to 30 minutes. But the injection itself usually takes only a few minutes. Your doctor may put ice on the area before you go home. You will probably go home soon after your shot. What can you expect after a joint injection? You may have numbness around the joint for a few hours. If your shot included both a pain reliever and a steroid, then the pain will probably go away right away. But it might come back after a few hours. This might happen if the pain reliever wears off and the steroid hasn't started to work yet. Steroids don't always work. But when they do, the pain relief can last for several days to a few months or longer. Your doctor may tell you to use ice on the area. You can also use ice if the pain comes back. Put ice or a cold pack on your joint for 10 to 20 minutes at a time. Put a thin cloth between the ice and your skin. Follow your doctor's instructions carefully. Follow-up care is a key part of your treatment and safety. Be sure to make and go to all appointments, and call your doctor if you are having problems. It's also a good idea to know your test results and keep a list of the medicines you take. Where can you learn more? Go to http://mouna-noris.info/.  
Enter A344 in the search box to learn more about \"Learning About Joint Injections. \" Current as of: September 14, 2017 Content Version: 11.8 © 2793-4736 [employer]BS. Care instructions adapted under license by [employer] (which disclaims liability or warranty for this information). If you have questions about a medical condition or this instruction, always ask your healthcare professional. [address] any warranty or liability for your use of this information. Introducing [facility]! [employer] introduces zEconomy patient portal. Now you can access parts of your medical record, email your doctor's office, and request medication refills online. 1. In your internet browser, go to [email or web address] [employer]/[address]. 2. Click on the First Time User? Click Here link in the Sign In box. You will see the New Member Sign Up page. 3. Enter your zEconomy Access Code exactly as it appears below. You will not need to use this code after youve completed the sign-up process. If you do not sign up before the expiration date, you must request a new code. · zEconomy Access Code: [email or web address] Expires: 11/26/2018 12:19 PM 
 
4. Enter the last four digits of your Social Security Number (xxxx) and Date of Birth (mm/dd/yyyy) as indicated and click Submit. You will be taken to the next sign-up page. 5. Create a zEconomy ID. This will be your zEconomy login ID and cannot be changed, so think of one that is secure and easy to remember. 6. Create a zEconomy password. You can change your password at any time. 7. Enter your Password Reset Question and Answer. This can be used at a later time if you forget your password. 8. Enter your e-mail address. You will receive e-mail notification when new information is available in [address] 9. Click Sign Up. You can now view and download portions of your medical record. 10. Click the Download Summary menu link to download a portable copy of your medical information. If you have questions, please visit the Frequently Asked Questions section of the LiveOpst website. Remember, CumuLogic is NOT to be used for urgent needs. For medical emergencies, dial 911. Now available from your iPhone and Android! Please provide this summary of care documentation to your next provider. Your primary care clinician is listed as Joao Dubose. If you have any questions after today's visit, please call [phone].

## 2019-04-23 ENCOUNTER — HOSPITAL ENCOUNTER (OUTPATIENT)
Dept: PHYSICAL THERAPY | Age: 64
Discharge: HOME | End: 2019-04-23
Payer: MEDICAID

## 2019-04-23 PROCEDURE — 97161 PT EVAL LOW COMPLEX 20 MIN: CPT

## 2019-04-23 PROCEDURE — 97110 THERAPEUTIC EXERCISES: CPT

## 2019-04-23 NOTE — PROGRESS NOTES
[facility] PHYSICAL THERAPY 
[address] [place] [address] [address],Maria Elena Coy, [facility] - Phone: [phone]  Fax: [phone] PLAN OF CARE / STATEMENT OF MEDICAL NECESSITY FOR PHYSICAL THERAPY SERVICES Patient Name: Anamaria Rutledge : 1955 Medical  
Diagnosis: L Shoulder Impingement Treatment Diagnosis: Left shoulder pain [M25.512] Onset Date: 4 weeks prior to PT eval    
Referral Source: Kael Diaz MD Start of Care [facility]): 2019 Prior Hospitalization: See medical history Provider #: [id number] Prior Level of Function: Complete grooming activities, dressing activities, overhead motions and behind back motions without L shoulder pain or difficulty Comorbidities: HIV, Hepatitis C, HTN, Thyroid Condition, OA, Chronic Kidney Dz, Current B knee pain (pt uses SPC with amb), R UE neuropathy Medications: Verified on Patient Summary List  
The Plan of Care and following information is based on the information from the initial evaluation.  
=========================================================================================== Assessment / key information:  Pt is a 60 y/o female reporting L shoulder pain rated 5-7/10 that started approximately 4 weeks ago with insidious onset. Pain located ant/lat shoulder > post shoulder. Pain increases with combing hair/grooming activities, dressing activities, lifting arm overhead and behind back; decreases with rest. Pt reports R UE neuropathy, but denies L UE numbness/tingling. Pt denies neck pain or L UE radicular pain. Pt is R hand dominant. Pt denies falls or red flags. Pt unsure of popping/clicking/cracking sensation in L shoulder. Pt reports MD gave her script for x-ray of L shoulder, but she has not had imaging performed yet. Pt reports MD diagnosed her with OA and impingement with physical examination.  Pt demonstrates poor posture, decreased L shoulder AROM (Seated: 65 flexion, 68 scaption, 54 ABD, FIR L5; Supine: 131 flex, 104 ABD, 41 ER in scap plane), L shoulder PROM>AROM, decreased L post GH jt mobility, (+) L Rogers and Empty Can, (-) Neers, Crossover and Drop Arm, TTP L AC joint and LH biceps, deferred L shoulder strength testing secondary to pain, decreased scapular stability and decreased functional mobility. FOTO Score: 53/100 
=========================================================================================== Eval Complexity: History MEDIUM  Complexity : 1-2 comorbidities / personal factors will impact the outcome/ POC ;  Examination  HIGH Complexity : 4+ Standardized tests and measures addressing body structure, function, activity limitation and / or participation in recreation ; Presentation LOW Complexity : Stable, uncomplicated ;  Decision Making MEDIUM Complexity : FOTO score of 26-74; Overall Complexity LOW Problem List: pain affecting function, decrease ROM, decrease strength, decrease ADL/ functional abilitiies, decrease activity tolerance, decrease flexibility/ joint mobility and decrease transfer abilities Treatment Plan may include any combination of the following: Therapeutic exercise, Therapeutic activities, Neuromuscular re-education, Physical agent/modality, Manual therapy, Patient education, Self Care training, Functional mobility training and Home safety training Patient / Family readiness to learn indicated by: asking questions, trying to perform skills and interest 
Persons(s) to be included in education: patient (P) Barriers to Learning/Limitations: None Measures taken, if barriers to learning:   
Patient Goal (s): \"Learn how to deal with the arthritis\" Patient self reported health status: poor Rehabilitation Potential: good ? Short Term Goals: To be accomplished in  2  weeks: 1. Pt to demonstrate independence with HEP to improve L shoulder AROM and shoulder strength for grooming activities/dressing activities. 2. Pt to demonstrate 90 degrees or > L shoulder flexion/scaption AROM in sitting to improve mobility for grooming activities/dressing activities. ? Long Term Goals: To be accomplished in  4-6  weeks: 1. Pt to report 69/100 or > on FOTO scores to improve functional mobility for grooming/dressing activities and reaching activities. 2. Pt to demonstrate 125 degrees or > L shoulder seated flex/scaption AROM in sitting/standing to improve mobility for grooming/dressing activities and reaching top shelves in home. 3. Pt to demonstrate 50 degrees or > L shoulder ER AROM to improve mobility for grooming/dressing activities. Frequency / Duration:   Patient to be seen  2  times per week for 4-6  weeks: 
Patient / Caregiver education and instruction: self care, activity modification and exercises Therapist Signature: Lilliana Escudero PT Date: 4/23/2019 Certification Period: N/A Time: 3:10 PM  
=========================================================================================== I certify that the above Physical Therapy Services are being furnished while the patient is under my care. I agree with the treatment plan and certify that this therapy is necessary. Physician Signature:       Date:      Time:  Please sign and return to [facility], [facility]. or you may fax the signed copy to [phone]. Thank you.

## 2019-04-23 NOTE — PROGRESS NOTES
PHYSICAL THERAPY - DAILY TREATMENT NOTE Patient Name: Usrzula Precise        Date: 2019 : 1955   yes Patient  Verified Visit #:     Insurance: Payor: DAR Hernandez / Plan: [facility] / Product Type: Managed Care Medicaid / In time: 2:30 Out time: 3:10 Total Treatment Time: 40 Medicare/BCBS Time Tracking (below) Total Timed Codes (min):  N/A 1:1 Treatment Time:  N/A  
TREATMENT AREA =  Left shoulder pain [M25.512] SUBJECTIVE Pain Level (on 0 to 10 scale):  7  / 10 Medication Changes/New allergies or changes in medical history, any new surgeries or procedures?    no  If yes, update Summary List  
Subjective Functional Status/Changes:  []  No changes reported See Initial eval  
 
  
 
OBJECTIVE 15 min Therapeutic Exercise:  [x]  See flow sheet Rationale:      increase ROM and increase strength to improve the patients ability to complete dressing/grooming activities. Billed With/As: 
 [x] TE 
 [] TA 
 [] Neuro 
 [] Self Care Patient Education: [x] Review HEP [] Progressed/Changed HEP based on:  
[] positioning   [] body mechanics   [] transfers   [] heat/ice application   
[x] other: Pt instructed on and given HEP to be completed daily. Pt instructed to continue to move shoulder to avoid further loss of motion, but to avoid repetitive overhead motions, overhead lifting, heavy lifting and quick movements. Pt instructed to ice prn 10-15 minutes on / 1 hour off. Pt educated on red flags and to seek immediate medical attention/911 if those occur. Reviewed POC and goals. Pt reports understanding. Other Objective/Functional Measures: 
 
See initial eval 
  
Post Treatment Pain Level (on 0 to 10) scale:   0  / 10 ASSESSMENT Assessment/Changes in Function:  
 
See initial eval 
  
[]  See Progress Note/Recertification Patient will continue to benefit from skilled PT services to see initial eval  
Progress toward goals / Updated goals: Pt denied sharp pains or red flags with initial eval or therapeutic ex. See initial eval.  
 
PLAN [x]  Upgrade activities as tolerated yes Continue plan of care  
[]  Discharge due to :   
[x]  Other: PT 2x/week for 4-6 weeks. Therapist: Jesusita Dsouza PT Date: 4/23/2019 Time: 3:10 PM  
 
Future Appointments Date Time Provider Jenni Galvan 5/3/2019  2:00 PM Harpreet Wilson [facility]  
5/8/2019  2:00 PM Harpreet Wilson [facility]  
5/10/2019  2:00 PM Tirso Silverman [facility]

## 2019-05-03 ENCOUNTER — APPOINTMENT (OUTPATIENT)
Dept: PHYSICAL THERAPY | Age: 64
End: 2019-05-03

## 2019-05-08 ENCOUNTER — APPOINTMENT (OUTPATIENT)
Dept: PHYSICAL THERAPY | Age: 64
End: 2019-05-08

## 2019-05-10 ENCOUNTER — APPOINTMENT (OUTPATIENT)
Dept: PHYSICAL THERAPY | Age: 64
End: 2019-05-10

## 2019-05-23 NOTE — PROGRESS NOTES
[address] [facility] THERAPY  [address], [place] [address],[facility], [address] [facility] - Phone: [phone]  Fax: [phone]    DISCHARGE NOTE  Patient Name: Aruna Sanders : 1955   Treatment/Medical Diagnosis: Left shoulder pain [M25.512], L Shoulder Impingement   Referral Source: Manuela Amin MD     Date of Initial Visit: 19 Attended Visits: 1 Missed Visits: 0       SUMMARY OF TREATMENT  Pt attended only initial evaluation and     0     follow-ups and then did not return. Therefore a formal reassessment of goals was not performed. RECOMMENDATIONS  Discontinue physical therapy due to patient not returning. If you have any questions/comments please contact us directly at [phone]. Thank you for allowing us to assist in the care of your patient. Therapist Signature: Kasey Cedeno PT Date: 19     Time: 2:11 PM       NOTE TO PHYSICIAN:  Your patient's insurance requires this discharge note be signed and returned. PLEASE COMPLETE THE ORDERS BELOW AND RETURN TO:  [facility] PHYSICAL THERAPY    ___ I have read the above report and request that my patient be discharged from therapy.      Physician Signature:        Date:       Time:

## 2020-07-02 ENCOUNTER — APPOINTMENT (OUTPATIENT)
Dept: PHYSICAL THERAPY | Age: 65
End: 2020-07-02
Payer: MEDICAID

## 2020-07-07 ENCOUNTER — HOSPITAL ENCOUNTER (OUTPATIENT)
Dept: PHYSICAL THERAPY | Age: 65
Discharge: HOME | End: 2020-07-07
Payer: MEDICAID

## 2020-07-07 PROCEDURE — 97162 PT EVAL MOD COMPLEX 30 MIN: CPT

## 2020-07-07 NOTE — PROGRESS NOTES
[address] PHYSICAL THERAPY  [address] [place] [address], [address],[place], [address] [facility] [address] - Phone: [phone]  Fax: [phone] / [address] [facility]  Patient Name: Aung Hernández : 1955   Medical   Diagnosis: Radiculopathy, lumbar region [M54.16] Treatment Diagnosis: Radiculopathy, lumbar region [M54.16]   Onset Date: Chronic     Referral Source: Mela Bishop MD [facility]): 2020   Prior Hospitalization: See medical history Provider #: [id number]   Prior Level of Function: Chronic hx of LBP, difficulty with ambulation, standing, stairs, amb with SPC   Comorbidities: HBP, HIV, Arthritis, Depression   Medications: Verified on Patient Summary List   The Plan of Care and following information is based on the information from the initial evaluation.   ==================================================================================  Assessment / key information:    Assessment / key information:  Pt is 60year old female presenting with LBP with R LE radiculopathy. Pt reports back pain has been ongoing with recent flare in pain 2 weeks ago. Pt's most recent imaging revealed multi-level DDD with arthropathy and L3-4 spondylolisthesis Grade 1. The patient  presents with pain ranging from 4-7/10, located in both sides of the L/S. Pain is made worse with standing and weight bearing activities, better with bending forward and laying down. Pt has pain as a radicular sxs in the R LE which is intermittent. L/S AROM: flexion limited by 50%, extension limited by 75% with pain, LSB limited by 50%, RSB limited by 50%, LRotation limited by 50% RRotation limited by 50%. Palpation reveals TTP along the L/S paraspinals, QL, piriformis. Posture mild decrease in lumbar lordosis. Gait with decreased pasha, decreased trunk rotation, decreased heel toe walking with use of SPC.   Sensation is intact to light touch with N/T.  MMT LEs: grossly 3+/5 on the R hip, knee, and ankle, and 4-/5 in the L LE. Core stability is decreased with difficulty with bed mobility. (-) Special tests include: SLR, Slump. The patient was instructed in a home exercise program to address the above findings/deficits. Pt will benefit from PT interventions to address the aforementioned deficits and allow pt to return to PLOF.     ==================================================================================  Eval Complexity: History HIGH Complexity :3+ comorbidities / personal factors will impact the outcome/ POC ;  Examination  HIGH Complexity : 4+ Standardized tests and measures addressing body structure, function, activity limitation and / or participation in recreation ; Presentation MEDIUM Complexity : Evolving with changing characteristics ; Decision Making MEDIUM Complexity : FOTO score of 26-74; Overall Complexity MEDIUM  Problem List: pain affecting function, decrease ROM, decrease strength, impaired gait/ balance, decrease ADL/ functional abilitiies, decrease activity tolerance, decrease flexibility/ joint mobility and decrease transfer abilities   Treatment Plan may include any combination of the following: Therapeutic exercise, Therapeutic activities, Neuromuscular re-education, Physical agent/modality, Gait/balance training, Manual therapy, Patient education, Self Care training, Functional mobility training and Stair training  Patient / Family readiness to learn indicated by: asking questions, trying to perform skills and interest  Persons(s) to be included in education: patient (P)   Barriers to Learning/Limitations: None  Measures taken, if barriers to learning:    Patient Goal (s): Manage pain better   Patient self reported health status: poor  Rehabilitation Potential: good   Short Term Goals: To be accomplished in  4  treatments:  1. Pt to demonstrate independence with HEP to improve functional mobility for ADLs.   2. Pt will report 50% overall improvement with standing tolerance in order to improve functional ability to perform ADL's.  Long Term Goals: To be accomplished in  8  treatments:  1. Improve FOTO outcome score to 54/100 in order to indicate a significant improvement in ADL function. 2. Pt to report +5 or > on GROC to improve functional mobility for ADLs. 3. Pt to demonstrate l/s AROM WFL to improve functional mobility for ADLs. 4. Pt will report 75% overall improvement in functional ADL's in order to return to PLOF. 5. Patient will be independent with long term HEP in order to prepare for DC to home. Frequency / Duration:   Patient to be seen  1-2  times per week for 8  treatments:  Patient / Caregiver education and instruction: exercises  G-Codes (GP): DANO  Therapist Signature: Daniel Thomas PT, DPT   Date: 2/4/7310   Certification Period: NA Time: 9:48 AM   ==================================================================================  I certify that the above Physical Therapy Services are being furnished while the patient is under my care. I agree with the treatment plan and certify that this therapy is necessary. Physician Signature:        Date:       Time:     Please sign and return to [facility]. or you may fax the signed copy to [phone]. Thank you.

## 2020-07-07 NOTE — PROGRESS NOTES
PHYSICAL THERAPY - DAILY TREATMENT NOTE    Patient Name: Chuckie Hoover        Date: 2020  : 1955   yes Patient  Verified  Visit #:   1     Insurance: Payor: [employer] MEDICAID / Plan: [facility] / Product Type: Managed Care Medicaid /      In time: 300 Out time: 340   Total Treatment Time: 40     Medicare/BCBS Time Tracking (below)   Total Timed Codes (min):  na 1:1 Treatment Time:  na     TREATMENT AREA =  Radiculopathy, lumbar region [M54.16]    SUBJECTIVE  Pain Level (on 0 to 10 scale):  5  / 10   Medication Changes/New allergies or changes in medical history, any new surgeries or procedures?    no  If yes, update Summary List   Subjective Functional Status/Changes:  []  No changes reported     SEE POC         OBJECTIVE    40 min Evaluation     ND min Therapeutic Exercise:  [x]  See flow sheet   Rationale:      increase ROM and increase strength to improve the patients ability to perform functional ADL's     Billed With/As:   [] TE   [] TA   [] Neuro   [] Self Care Patient Education: [x] Review HEP    [] Progressed/Changed HEP based on:   [] positioning   [] body mechanics   [] transfers   [] heat/ice application    [] other:      Other Objective/Functional Measures:    SEE POC     Post Treatment Pain Level (on 0 to 10) scale:   5  / 10     ASSESSMENT  Assessment/Changes in Function:     Evaluation Code Complexity: History HIGH Complexity :3+ comorbidities / personal factors will impact the outcome/ POC ; Examination HIGH Complexity : 4+ Standardized tests and measures addressing body structure, function, activity limitation and / or participation in recreation ; Presentation MEDIUM Complexity : Evolving with changing characteristics ; Decision Making MEDIUM Complexity : FOTO score of 26-74;  Complexity MEDIUM    Justification for Eval Code Complexity:  Patient History : HIV, HBP, arthritis  Examination SEE ABOVE EXAM  Clinical Presentation: evolving pain  Clinical Decision Making : FOTO 39         []  See Progress Note/Recertification   Patient will continue to benefit from skilled PT services to modify and progress therapeutic interventions, address functional mobility deficits, address ROM deficits, address strength deficits, analyze and address soft tissue restrictions, analyze and cue movement patterns, analyze and modify body mechanics/ergonomics and assess and modify postural abnormalities to attain remaining goals. Progress toward goals / Updated goals:    SEE POC     PLAN  []  Upgrade activities as tolerated yes Continue plan of care   []  Discharge due to :    [x]  Other: Pt will be seen 1-2 times per week for 8 sessions.       Therapist: Rosalia St, PT, DPT    Date: 7/7/2020 Time: 9:49 AM     Future Appointments   Date Time Provider Jenni Galvan   7/7/2020  3:00 PM Mary Urbano, PT [facility] [id number]

## 2020-07-22 ENCOUNTER — HOSPITAL ENCOUNTER (OUTPATIENT)
Dept: PHYSICAL THERAPY | Age: 65
Discharge: HOME | End: 2020-07-22
Payer: MEDICAID

## 2020-07-22 PROCEDURE — 97140 MANUAL THERAPY 1/> REGIONS: CPT

## 2020-07-22 PROCEDURE — 97110 THERAPEUTIC EXERCISES: CPT

## 2020-07-22 PROCEDURE — 97535 SELF CARE MNGMENT TRAINING: CPT

## 2020-07-29 ENCOUNTER — HOSPITAL ENCOUNTER (OUTPATIENT)
Dept: PHYSICAL THERAPY | Age: 65
Discharge: HOME | End: 2020-07-29
Payer: MEDICAID

## 2020-07-29 PROCEDURE — 97014 ELECTRIC STIMULATION THERAPY: CPT

## 2020-07-29 PROCEDURE — 97110 THERAPEUTIC EXERCISES: CPT

## 2020-07-29 NOTE — PROGRESS NOTES
PHYSICAL THERAPY - DAILY TREATMENT NOTE    Patient Name: Fredrich Cabot        Date: 2020  : 1955   yes Patient  Verified  Visit #:   3   of   12  Insurance: Payor: Jorge Anne / Plan: [address] [place] [address] / Product Type: Managed Care Medicaid /      In time: 8700 Out time: 1130   Total Treatment Time: 45     Medicare/BCBS Time Tracking (below)   Total Timed Codes (min):  na 1:1 Treatment Time:  na     TREATMENT AREA =  Low back pain [M54.5]  Lower extremity pain [M79.606]    SUBJECTIVE  Pain Level (on 0 to 10 scale):  4  / 10   Medication Changes/New allergies or changes in medical history, any new surgeries or procedures?    no  If yes, update Summary List   Subjective Functional Status/Changes:  []  No changes reported     The pain is mostly in my hips and back. Don't have no leg pain right now. OBJECTIVE  Modalities Rationale:     decrease inflammation and decrease pain to improve patient's ability to perform functional mobility activities with decrease c/o symptoms.   10 min [x] Estim, type/location: IFC prone with 2 pillows, IFC                                         []  att     [x]  unatt     []  w/US     [x]  w/ice    []  w/heat    min []  Mechanical Traction: type/lbs                   []  pro   []  sup   []  int   []  cont    []  before manual    []  after manual    min []  Ultrasound, settings/location:      min []  Iontophoresis w/ dexamethasone, location:                                               []  take home patch       []  in clinic    min []  Ice     []  Heat    location/position:     min []  Vasopneumatic Device, press/temp:     min []  Other:    [x] Skin assessment post-treatment (if applicable):    [x]  intact    []  redness- no adverse reaction     []redness  adverse reaction:        28 min Therapeutic Exercise:  [x]  See flow sheet   Rationale:      increase ROM, increase strength, improve coordination and improve balance to improve the patients ability to perform functional mobility activities with decrease c/o symptoms. 7 (NC) min Manual Therapy: Prone 1 pillow: STM L/S paraspinals and upper glute region. Rationale:      decrease pain, increase ROM and increase tissue extensibility to improve patient's ability to perform functional mobility activities with decrease c/o symptoms. Billed With/As:   [x] TE   [] TA   [] Neuro   [] Self Care Patient Education: [x] Review HEP    [] Progressed/Changed HEP based on:   [] positioning   [] body mechanics   [] transfers   [] heat/ice application    [] other:      Other Objective/Functional Measures:    ttp at (L) piriformis > ttp on (R) piriformis. Post Treatment Pain Level (on 0 to 10) scale:   4  / 10     ASSESSMENT  Assessment/Changes in Function:     Add (B) piriformis stretch 3 x 10 seconds to decrease tenderness. Patient with good tolerance to all therx and manual, no increase or change in pain at end of session. Patient to continue with prone lying but without any pillows. Patient verbalized understanding. []  See Progress Note/Recertification   Patient will continue to benefit from skilled PT services to modify and progress therapeutic interventions, address functional mobility deficits, address ROM deficits, address strength deficits, analyze and address soft tissue restrictions and analyze and cue movement patterns to attain remaining goals. Progress toward goals / Updated goals: · Short Term Goals: To be accomplished in  4  treatments:  1. Pt to demonstrate independence with HEP to improve functional mobility for ADLs. 2. Pt will report 50% overall improvement with standing tolerance in order to improve functional ability to perform ADL's.     · Long Term Goals: To be accomplished in  8  treatments:  1. Improve FOTO outcome score to 54/100 in order to indicate a significant improvement in ADL function.   2. Pt to report +5 or > on GROC to improve functional mobility for ADLs.  3. Pt to demonstrate l/s AROM WFL to improve functional mobility for ADLs. 4. Pt will report 75% overall improvement in functional ADL's in order to return to PLOF.    5. Patient will be independent with long term HEP in order to prepare for DC to home       PLAN  []  Upgrade activities as tolerated yes Continue plan of care   []  Discharge due to :    []  Other:      Therapist: Delia Prabhakar PTA    Date: 7/29/2020 Time: 6:26 AM     Future Appointments   Date Time Provider Jenni Galvan   7/29/2020 10:45 AM Gabriela Cohen, [facility]   8/5/2020 11:30 AM Gabriela Cohen PTA [address] [address]   8/12/2020 11:30 AM Gabriela Cohen PTA [facility] [address]   8/19/2020 11:30 AM Gabriela Cohen PTA [address] [address]

## 2020-08-05 ENCOUNTER — HOSPITAL ENCOUNTER (OUTPATIENT)
Dept: PHYSICAL THERAPY | Age: 65
Discharge: HOME | End: 2020-08-05
Payer: MEDICAID

## 2020-08-05 PROCEDURE — 97110 THERAPEUTIC EXERCISES: CPT

## 2020-08-05 PROCEDURE — 97014 ELECTRIC STIMULATION THERAPY: CPT

## 2020-08-05 NOTE — PROGRESS NOTES
PHYSICAL THERAPY - DAILY TREATMENT NOTE    Patient Name: Herber Zazueta        Date: 2020  : 1955   yes Patient  Verified  Visit #:     Insurance: Payor: Izabela Nguyen / Plan: Jam Varner / Product Type: Managed Care Medicaid /      In time:  Out time:    Total Treatment Time: 40     Medicare/BCBS Time Tracking (below)   Total Timed Codes (min): 40 1:1 Treatment Time: 33     TREATMENT AREA =  Low back pain [M54.5]  Lower extremity pain [M79.606]    SUBJECTIVE  Pain Level (on 0 to 10 scale):  2  / 10   Medication Changes/New allergies or changes in medical history, any new surgeries or procedures?    no  If yes, update Summary List   Subjective Functional Status/Changes:  []  No changes reported     I be having a good day today, not much pain at all     Patient denies radicular symptoms, pain along lower back and hip area. OBJECTIVE  Modalities Rationale:     decrease inflammation and decrease pain to improve patient's ability to perform functional mobility activities with decrease c/o symptoms.   10 min [x] Estim, type/location: IFC prone with 2 pillows, IFC                                            []  att     [x]  unatt     []  w/US     [x]  w/ice    []  w/heat    min []  Mechanical Traction: type/lbs                   []  pro   []  sup   []  int   []  cont    []  before manual    []  after manual    min []  Ultrasound, settings/location:      min []  Iontophoresis w/ dexamethasone, location:                                               []  take home patch       []  in clinic    min []  Ice     []  Heat    location/position:     min []  Vasopneumatic Device, press/temp:     min []  Other:    [x] Skin assessment post-treatment (if applicable):    [x]  intact    []  redness- no adverse reaction     []redness  adverse reaction:        23 min Therapeutic Exercise:  [x]  See flow sheet   Rationale:      increase ROM, increase strength, improve coordination and improve balance to improve the patients ability to perform functional mobility activities with decrease c/o symptoms. 7 (NC) min Manual Therapy: Prone 1 pillow: STM L/S paraspinals and upper glute region. Rationale:      decrease pain, increase ROM and increase tissue extensibility to improve patient's ability to perform functional mobility activities with decrease c/o symptoms. Billed With/As:   [x] TE   [] TA   [] Neuro   [] Self Care Patient Education: [x] Review HEP    [] Progressed/Changed HEP based on:   [] positioning   [] body mechanics   [] transfers   [] heat/ice application    [] other:      Other Objective/Functional Measures:    Decrease TTP along (B) upper glute and L/S paraspinals and R piriformis    Add SB flexion in seated position 5 x 10 seconds. TRA with SB 10 x 5 seconds     Post Treatment Pain Level (on 0 to 10) scale:   0  / 10     ASSESSMENT  Assessment/Changes in Function:     Reporting ~ 60  % overall improvement with standing tolerance in order to improve functional ability to perform ADL's. Patient reports that she has been compliant with her HEP. []  See Progress Note/Recertification   Patient will continue to benefit from skilled PT services to modify and progress therapeutic interventions, address functional mobility deficits, address ROM deficits, address strength deficits, analyze and address soft tissue restrictions and analyze and cue movement patterns to attain remaining goals. Progress toward goals / Updated goals: · Short Term Goals: To be accomplished in  4  treatments:  1. Pt to demonstrate independence with HEP to improve functional mobility for ADLs. Progressing well 8/5/2020  2. Pt will report 50% overall improvement with standing tolerance in order to improve functional ability to perform ADL's.     · Long Term Goals: To be accomplished in  8  treatments:  1.  Improve FOTO outcome score to 54/100 in order to indicate a significant improvement in ADL function. 2. Pt to report +5 or > on GROC to improve functional mobility for ADLs. 3. Pt to demonstrate l/s AROM WFL to improve functional mobility for ADLs. 4. Pt will report 75% overall improvement in functional ADL's in order to return to PLOF. Progressing well 8/5/2020  5.  Patient will be independent with long term HEP in order to prepare for DC to home     PLAN  []  Upgrade activities as tolerated yes Continue plan of care   []  Discharge due to :    []  Other:      Therapist: Michelle Low PTA    Date: 8/5/2020 Time: 6:21 AM     Future Appointments   Date Time Provider Jenni Galvan   8/5/2020 11:30 AM Jamir Emanuel [facility]   8/12/2020 11:30 AM Jerry Anderson PTA [address] [address]   8/19/2020 11:30 AM Jerry Anderson PTA [address] [address]

## 2020-08-12 ENCOUNTER — HOSPITAL ENCOUNTER (OUTPATIENT)
Dept: PHYSICAL THERAPY | Age: 65
End: 2020-08-12
Payer: MEDICAID

## 2020-08-12 NOTE — PROGRESS NOTES
[address] PHYSICAL THERAPY  [address] [place] [address] [address] 201,[facility], [facility] - Phone: [phone]  Fax: [phone]   PROGRESS NOTE    Patient Name: Nikolai Anderson : 1955   Treatment/Medical Diagnosis: Low back pain [M54.5]  Lower extremity pain [M79.606]     Referral Source: Pearson Seip, MD     Date of Initial Visit: 2020 Attended Visits: 4 Missed Visits: 2     SUMMARY OF TREATMENT  Nikolai Anderson has been seen at our clinic 2-3x/wk for a total of 4 visits. Pt treatment has consisted of aerobic warm-up with treadmill, therapeutic exercise for lumbar ROM, hip/core strengthening, modalities prn and manual therapy (manual stretch of hamstring)    CURRENT STATUS  Pt has had a good tolerance to physical therapy treatment. Patient has been responding well with all manual treatment and therx routine overall. Patient has had difficulty with transportation resulting in attendance issues which has limited her progression toward goals. · Short Term Goals: To be accomplished in  4  treatments:  1. Pt to demonstrate independence with HEP to improve functional mobility for ADLs. Progressing well 2020  2. Pt will report 50% overall improvement with standing tolerance in order to improve functional ability to perform ADL's. Achieved 2020    Goal/Measure of Progress Goal Met? 1. Improve FOTO outcome score to 54/100 in order to indicate a significant improvement in ADL function. Status at last Eval: 45 Current Status: 40 no   2. Pt to report +5 or > on GROC to improve functional mobility for ADLs. Status at last Eval:  Current Status: 0:no change no   3. Pt to demonstrate l/s AROM WFL to improve functional mobility for ADLs. Status at last Eval: Reduced Current Status: Lacking 25% no     4. Pt will report 75% overall improvement in functional ADL's in order to return to PLOF.    Status at last Eval:  Current Status: Reporting  little to no overall improvement with standing tolerance in order to improve functional ability to perform ADL's. no   5. Patient will be independent with long term HEP in order to prepare for DC to home   Status at last Eval:  Current Status: Received HEP Progressing. New Goals to be achieved in __4_  weeks:  1. Improve FOTO outcome score to 54/100 in order to indicate a significant improvement in ADL function. 2.  Pt to report +5 or > on GROC to improve functional mobility for ADLs. 3.  Pt to demonstrate l/s AROM WFL to improve functional mobility for ADLs. 4.  Pt will report 75% overall improvement in functional ADL's in order to return to PLOF. 5.  Patient will be independent with long term HEP in order to prepare for DC to home     RECOMMENDATIONS  We would like to continue treatment 2x/wk for 4weeks to progress patient further toward goals. Please advise. Thank you. If you have any questions/comments please contact us directly at [phone]. Thank you for allowing us to assist in the care of your patient. LPTA Signature: Jane Woodson PTA Date: 8/12/2020   PT Signature: Vandana Urbano PT Time: 9:18 AM     NOTE TO PHYSICIAN:  PLEASE COMPLETE THE ORDERS BELOW AND FAX TO   [facility] Physical Therapy: [phone]  If you are unable to process this request in 24 hours please contact our office: [phone]    ___ I have read the above report and request that my patient continue as recommended.   ___ I have read the above report and request that my patient continue therapy with the following changes/special instructions:_________________________________________________________   ___ I have read the above report and request that my patient be discharged from therapy.      Physician Signature:        Date:       Time:

## 2020-08-19 ENCOUNTER — HOSPITAL ENCOUNTER (OUTPATIENT)
Dept: PHYSICAL THERAPY | Age: 65
Discharge: HOME | End: 2020-08-19
Payer: MEDICAID

## 2020-08-19 PROCEDURE — 97014 ELECTRIC STIMULATION THERAPY: CPT

## 2020-08-19 PROCEDURE — 97110 THERAPEUTIC EXERCISES: CPT

## 2020-08-19 NOTE — PROGRESS NOTES
PHYSICAL THERAPY - DAILY TREATMENT NOTE    Patient Name: Bert Chopra        Date: 2020  : 1955   yes Patient  Verified  Visit #:     Insurance: Payor: [employer] / Plan:  [facility] / Product Type: Managed Care Medicaid /      In time: 4581 Out time: 3809   Total Treatment Time: 32     Medicare/BCBS Time Tracking (below)   Total Timed Codes (min):  45 1:1 Treatment Time:  38     TREATMENT AREA =  Low back pain [M54.5]  Lower extremity pain [M79.606]    SUBJECTIVE  Pain Level (on 0 to 10 scale):  8  / 10   Medication Changes/New allergies or changes in medical history, any new surgeries or procedures?    no  If yes, update Summary List   Subjective Functional Status/Changes:  []  No changes reported     Patient reports that she has been in really bad pain for the past 2 days. OBJECTIVE  Modalities Rationale:     decrease inflammation and decrease pain to improve patient's ability to perform functional mobility activities with decrease c/o symptoms.    10 min [x] Estim, type/location: IFC prone with 1 pillows, IFC                                           []  att     [x]  unatt     []  w/US     [x]  w/ice    []  w/heat    min []  Mechanical Traction: type/lbs                   []  pro   []  sup   []  int   []  cont    []  before manual    []  after manual    min []  Ultrasound, settings/location:      min []  Iontophoresis w/ dexamethasone, location:                                               []  take home patch       []  in clinic    min []  Ice     []  Heat    location/position:     min []  Vasopneumatic Device, press/temp:     min []  Other:    [x] Skin assessment post-treatment (if applicable):    [x]  intact    []  redness- no adverse reaction     []redness  adverse reaction:        22 min Therapeutic Exercise:  [x]  See flow sheet   Rationale:      increase ROM, increase strength, improve coordination and improve balance to improve the patients ability to perform functional mobility activities with decrease c/o symptoms. 7  (NC min Manual Therapy: Prone 1 pillow: STM L/S paraspinals and upper glute region. Rationale:      decrease pain, increase ROM and increase tissue extensibility to improve patient's ability to perform functional mobility activities with decrease c/o symptoms. Billed With/As:   [] TE   [] TA   [] Neuro   [] Self Care Patient Education: [x] Review HEP    [] Progressed/Changed HEP based on:   [] positioning   [] body mechanics   [] transfers   [] heat/ice application    [] other:      Other Objective/Functional Measures:    FOTO 40  GROC 0  Pt demonstrates l/s AROM  Lacking 25%   to improve functional mobility for ADLs. Post Treatment Pain Level (on 0 to 10) scale:   0  / 10     ASSESSMENT  Assessment/Changes in Function:     Patient reports little to no improvement with her ability to perform all general ADLs and functional mobility activities secondary to increase of back pain and radicular symptoms. []  See Progress Note/Recertification   Patient will continue to benefit from skilled PT services to modify and progress therapeutic interventions, address functional mobility deficits, address ROM deficits, address strength deficits, analyze and address soft tissue restrictions and analyze and cue movement patterns to attain remaining goals. Progress toward goals / Updated goals: · Short Term Goals: To be accomplished in  4  treatments:  1. Pt to demonstrate independence with HEP to improve functional mobility for ADLs. Progressing well 8/5/2020  2. Pt will report 50% overall improvement with standing tolerance in order to improve functional ability to perform ADL's.     · Long Term Goals: To be accomplished in  8  treatments:  1. Improve FOTO outcome score to 54/100 in order to indicate a significant improvement in ADL function. 2. Pt to report +5 or > on GROC to improve functional mobility for ADLs.   3. Pt to demonstrate l/s AROM WFL to improve functional mobility for ADLs. 4. Pt will report 75% overall improvement in functional ADL's in order to return to PLOF. Progressing well 8/5/2020  5.  Patient will be independent with long term HEP in order to prepare for DC to home     PLAN  []  Upgrade activities as tolerated yes Continue plan of care   []  Discharge due to :    []  Other:      Therapist: Michelle Low PTA    Date: 8/19/2020 Time: 6:27 AM     Future Appointments   Date Time Provider Jenni Galvan   8/19/2020 11:30 AM Jerry Anderson PTA [address] [id number]

## 2020-08-25 ENCOUNTER — HOSPITAL ENCOUNTER (OUTPATIENT)
Dept: PHYSICAL THERAPY | Age: 65
Discharge: HOME | End: 2020-08-25
Payer: MEDICAID

## 2020-08-25 PROCEDURE — 97014 ELECTRIC STIMULATION THERAPY: CPT

## 2020-08-25 PROCEDURE — 97110 THERAPEUTIC EXERCISES: CPT

## 2020-08-25 NOTE — PROGRESS NOTES
PHYSICAL THERAPY - DAILY TREATMENT NOTE    Patient Name: Fredrich Cabot        Date: 2020  : 1955   yes Patient  Verified  Visit #:     Insurance: [facility]: [facility] / Plan: [facility] / Product Type: Managed Care Medicaid /      In time: 46 Out time: 1050   Total Treatment Time: 45     Medicare/BCBS Time Tracking (below)   Total Timed Codes (min):  45 1:1 Treatment Time:  38     TREATMENT AREA =  Low back pain [M54.5]  Lower extremity pain [M79.606]    SUBJECTIVE  Pain Level (on 0 to 10 scale):  5  / 10   Medication Changes/New allergies or changes in medical history, any new surgeries or procedures?    no  If yes, update Summary List   Subjective Functional Status/Changes:  []  No changes reported     Not having to much pain today. Feeling better than most days.           OBJECTIVE  Modalities Rationale:     decrease inflammation and decrease pain to improve patient's ability to perform functional mobility activities with decrease c/o symptoms.   10 min [x] Estim, type/location: IFC prone with 1 pillows, IFC                                                []  att     [x]  unatt     []  w/US     [x]  w/ice    []  w/heat    min []  Mechanical Traction: type/lbs                   []  pro   []  sup   []  int   []  cont    []  before manual    []  after manual    min []  Ultrasound, settings/location:      min []  Iontophoresis w/ dexamethasone, location:                                               []  take home patch       []  in clinic    min []  Ice     []  Heat    location/position:     min []  Vasopneumatic Device, press/temp:     min []  Other:    [x] Skin assessment post-treatment (if applicable):    [x]  intact    []  redness- no adverse reaction     []redness  adverse reaction:        28 min Therapeutic Exercise:  [x]  See flow sheet   Rationale:      increase ROM, increase strength, improve coordination and improve balance to improve the patients ability to perform functional mobility activities with decrease c/o symptoms.      7 (NC) min Manual Therapy: Prone 1 pillow: STM L/S paraspinals and upper glute region. Rationale:      decrease pain, increase ROM and increase tissue extensibility to improve patient's ability to perform functional mobility activities with decrease c/o symptoms.       Billed With/As:   [] TE   [] TA   [] Neuro   [] Self Care Patient Education: [x] Review HEP    [] Progressed/Changed HEP based on:   [] positioning   [] body mechanics   [] transfers   [] heat/ice application    [] other:      Other Objective/Functional Measures:    Patient able to lay prone without pillow and tolerate manual without increase of leg pain. Post Treatment Pain Level (on 0 to 10) scale:   1  / 10     ASSESSMENT  Assessment/Changes in Function:     Patient with overall good tolerance to all manual and therx. Good relief of LBP at end of session and no reports of radicular symptoms. []  See Progress Note/Recertification   Patient will continue to benefit from skilled PT services to modify and progress therapeutic interventions, address functional mobility deficits, address ROM deficits, address strength deficits, analyze and address soft tissue restrictions and analyze and cue movement patterns to attain remaining goals. Progress toward goals / Updated goals: · Short Term Goals: To be accomplished in  4  treatments:  1. Pt to demonstrate independence with HEP to improve functional mobility for ADLs.  Progressing well 8/25/2020  2. Pt will report 50% overall improvement with standing tolerance in order to improve functional ability to perform ADL's.     · Long Term Goals: To be accomplished in  8  treatments:  1. Improve FOTO outcome score to 54/100 in order to indicate a significant improvement in ADL function. 2. Pt to report +5 or > on GROC to improve functional mobility for ADLs.   3. Pt to demonstrate l/s AROM WFL to improve functional mobility for ADLs progressing steadily 8/25/2020  4. Pt will report 75% overall improvement in functional ADL's in order to return to PLOF. Progressing well 8/25/2020  5.  Patient will be independent with long term HEP in order to prepare for DC to home       PLAN  []  Upgrade activities as tolerated yes Continue plan of care   []  Discharge due to :    []  Other:      Therapist: Carmen Mccarthy PTA    Date: 8/25/2020 Time: 7:21 AM     Future Appointments   Date Time Provider Jenni Galvan   8/25/2020 10:00 AM Rosalva Novak [address]   9/1/2020 11:30 AM Meet Herrera PTA [place] [address]   9/4/2020 11:00 AM Di Robles PTA [place] [address]   9/9/2020 11:30 AM Meet Herrera  [address] [facility]   9/11/2020 11:00 AM Reji Novak [address]   9/15/2020 10:45 AM Meet Herrera PTA [place] [address]   9/18/2020 11:00 AM Di Robles PTA [place] [address]

## 2020-09-01 ENCOUNTER — HOSPITAL ENCOUNTER (OUTPATIENT)
Dept: PHYSICAL THERAPY | Age: 65
Discharge: HOME | End: 2020-09-01
Payer: MEDICAID

## 2020-09-01 PROCEDURE — 97110 THERAPEUTIC EXERCISES: CPT

## 2020-09-01 NOTE — PROGRESS NOTES
PHYSICAL THERAPY - DAILY TREATMENT NOTE    Patient Name: Joe Dunbar        Date: 2020  : 1955   yes Patient  Verified  Visit #:     Insurance: Payor: [facility] / Plan: [facility] / Product Type: Managed Care Medicaid /      In time: 480 Out time: 180   Total Treatment Time: 22     Medicare/BCBS Time Tracking (below)   Total Timed Codes (min):  22 1:1 Treatment Time:  22     TREATMENT AREA =  Low back pain [M54.5]  Lower extremity pain [M79.606]    SUBJECTIVE  Pain Level (on 0 to 10 scale):  2  / 10   Medication Changes/New allergies or changes in medical history, any new surgeries or procedures?    no  If yes, update Summary List   Subjective Functional Status/Changes:  []  No changes reported     I do believe that therapy has been helping me with my pain and moving around. OBJECTIVE  Modalities Rationale:     decrease inflammation and decrease pain to improve patient's ability to perform functional mobility activities with decrease c/o sympto    22 min Therapeutic Exercise:  [x]  See flow sheet   Rationale:      increase ROM, increase strength, improve coordination and improve balance to improve the patients ability to perform functional mobility activities with decrease c/o symptoms.       Billed With/As:   [x] TE   [] TA   [] Neuro   [] Self Care Patient Education: [x] Review HEP    [] Progressed/Changed HEP based on:   [] positioning   [] body mechanics   [] transfers   [] heat/ice application    [] other:     Other Objective/Functional Measures:    FOTO 60  GROC 5     Post Treatment Pain Level (on 0 to 10) scale:   0  / 10     ASSESSMENT  Assessment/Changes in Function:     Patient reports ~50% overall improvement with all general ADLs and functional mobility activities.      []  See Progress Note/Recertification   Patient will continue to benefit from skilled PT services to modify and progress therapeutic interventions, address functional mobility deficits, address ROM deficits, address strength deficits, analyze and address soft tissue restrictions and analyze and cue movement patterns to attain remaining goals. Progress toward goals / Updated goals: · Short Term Goals: To be accomplished in  4  treatments:  1. Pt to demonstrate independence with HEP to improve functional mobility for ADLs.  Achieved 9/1/2020  2. Pt will report 50% overall improvement with standing tolerance in order to improve functional ability to perform ADL's.     · Long Term Goals: To be accomplished in  8  treatments:  1. Improve FOTO outcome score to 54/100 in order to indicate a significant improvement in ADL function. Achieved 9/1/2020  2. Pt to report +5 or > on GROC to improve functional mobility for ADLs. 3. Pt to demonstrate l/s AROM WFL to improve functional mobility for ADLs progressing steadily 9/1/2020  4. Pt will report 75% overall improvement in functional ADL's in order to return to PLOF. Progressing well 8/25/2020  5.  Patient will be independent with long term HEP in order to prepare for DC to home: progressing well 9/1/2020       PLAN  []  Upgrade activities as tolerated yes Continue plan of care   []  Discharge due to :    []  Other:      Therapist: Ni Craig PTA    Date: 9/1/2020 Time: 7:18 AM     Future Appointments   Date Time Provider Jenni Galvan   9/1/2020 11:30 AM Faby Sue,  [address] [facility]   9/4/2020 11:00 AM Natalia Counter, PTA [place] [address]   9/9/2020 11:30 AM Faby Sue  [address] [facility]   9/11/2020 11:00 AM Tanvi Nelson [address] [facility]   9/15/2020 10:45 AM Faby Sue, PTA [place] [address]   9/18/2020 11:00 AM Natalia Counter, PTA [place] [address]

## 2020-09-03 NOTE — PROGRESS NOTES
[facility] PHYSICAL THERAPY  [address] [place] [address] 51, [facility] 201,[facility], 70 [facility] - Phone: [phone]  Fax: [phone]   PROGRESS NOTE    Patient Name: Corrinne Armor : 1955   Treatment/Medical Diagnosis: Low back pain [M54.5]  Lower extremity pain [M79.606]     Referral Source: Parul Marvin MD     Date of Initial Visit: 2020 Attended Visits: 7 Missed Visits: 2     SUMMARY OF TREATMENT  Corrinne Armor has been seen at our clinic 2-3x/wk for a total of 7 visits. Pt treatment has consisted of aerobic warm-up with treadmill, therapeutic exercise for lumbar ROM, hip/core strengthening, modalities prn and manual therapy (Prone 1 pillow: STM L/S paraspinals and upper glute region.)    CURRENT STATUS  Pt has had a good tolerance to physical therapy treatment. Patient has been able to progress well with therapeutic interventions and has achieved 4 of 7 goals at this time. Patient has been demonstrating signs of improvement with body mechanics while performing sit<>supine and sit <> stand transfers. Patient continues to report difficulty with ambulating community distances due to increase of pain and decrease tolerance for prolonged distances. Patient continues to present with decrease gait pattern: slow pasha, decrease full step length (B) and decrease trunk motion. Patient reports that she does perform her HEP. · Short Term Goals: To be accomplished in  4  treatments:  1. Pt to demonstrate independence with HEP to improve functional mobility for ADLs.  Achieved 2020  2. Pt will report 50% overall improvement with standing tolerance in order to improve functional ability to perform ADL's. Achieved    Goal/Measure of Progress Goal Met? 1. Improve FOTO outcome score to 54/100 in order to indicate a significant improvement in ADL function. Status at last Eval: 40 Current Status: 60 Yes: 15 point overall improvement   2.   Pt to report +5 or > on GROC to improve functional mobility for ADLs   Status at last Eval: 0:no change Current Status: 5: a good deal better yes   3. Pt to demonstrate l/s AROM WFL to improve functional mobility for ADLs   Status at last Eval: Lacking 25% Current Status: Patient continues to demonstrate guarded motion with extension and flexion progressing     4. Pt will report 75% overall improvement in functional ADL's in order to return to PLOF.    Status at last Eval:  Current Status: Patient reports ~50% overall improvement with all general ADLs and functional mobility activities progressing   5. Patient will be independent with long term HEP in order to prepare for DC to home:   Status at last Eval:  Current Status: Patient to  Received extended HEP prior to dc progressing     New Goals to be achieved in _4__  weeks:  1. Pt to demonstrate l/s AROM WFL to improve functional mobility for ADLs   2. Pt will report 75% overall improvement in functional ADL's in order to return to PLOF.    3.  Patient will be independent with long term HEP in order to prepare for DC to home:     RECOMMENDATIONS  We would like to continue treatment 2x/wk for 4weeks to progress patient further toward goals. Please advise. Thank you. If you have any questions/comments please contact us directly at [phone]. Thank you for allowing us to assist in the care of your patient.     LPTA Signature: Junaid Bailey, [place] Date: 9/3/2020   PT Signature: Kathryn Urbano PT Time: 8:42 AM     NOTE TO PHYSICIAN:  PLEASE COMPLETE THE ORDERS BELOW AND FAX TO   [facility] Physical Therapy: [phone]  If you are unable to process this request in 24 hours please contact our office: [phone]    ___ I have read the above report and request that my patient continue as recommended.   ___ I have read the above report and request that my patient continue therapy with the following changes/special instructions:_________________________________________________________ ___ I have read the above report and request that my patient be discharged from therapy.      Physician Signature:        Date:       Time:

## 2020-09-04 ENCOUNTER — APPOINTMENT (OUTPATIENT)
Dept: PHYSICAL THERAPY | Age: 65
End: 2020-09-04
Payer: MEDICAID

## 2020-09-09 ENCOUNTER — HOSPITAL ENCOUNTER (OUTPATIENT)
Dept: PHYSICAL THERAPY | Age: 65
Discharge: HOME | End: 2020-09-09
Payer: MEDICAID

## 2020-09-09 PROCEDURE — 97110 THERAPEUTIC EXERCISES: CPT

## 2020-09-09 PROCEDURE — 97014 ELECTRIC STIMULATION THERAPY: CPT

## 2020-09-09 NOTE — PROGRESS NOTES
PHYSICAL THERAPY - DAILY TREATMENT NOTE    Patient Name: Richard Steinberg        Date: 2020  : 1955   yes Patient  Verified  Visit #:     Insurance: Payor: Nicole Regan / Plan: [facility] / Product Type: Managed Care Medicaid /      In time: 6 Out time: 396   Total Treatment Time: 39     Medicare/BCBS Time Tracking (below)   Total Timed Codes (min):  39 1:1 Treatment Time:  32     TREATMENT AREA =  Low back pain [M54.5]  Lower extremity pain [M79.606]    SUBJECTIVE  Pain Level (on 0 to 10 scale): 0 / 10   Medication Changes/New allergies or changes in medical history, any new surgeries or procedures?    no  If yes, update Summary List   Subjective Functional Status/Changes:  []  No changes reported     i'm not in pain right now, but yesterday I felt so bad I almost wanted to call 911.          OBJECTIVE  Modalities Rationale:     decrease inflammation and decrease pain to improve patient's ability to perform functional mobility activities with decrease c/o symptoms.   10 min [x] Estim, type/location: IFC prone with 1 pillows, L/S and upper glute                                     []  att     [x]  unatt     []  w/US     [x]  w/ice    []  w/heat    min []  Mechanical Traction: type/lbs                   []  pro   []  sup   []  int   []  cont    []  before manual    []  after manual    min []  Ultrasound, settings/location:      min []  Iontophoresis w/ dexamethasone, location:                                               []  take home patch       []  in clinic    min []  Ice     []  Heat    location/position:     min []  Vasopneumatic Device, press/temp:     min []  Other:    [x] Skin assessment post-treatment (if applicable):    [x]  intact    []  redness- no adverse reaction     []redness  adverse reaction:        22 min Therapeutic Exercise:  [x]  See flow sheet   Rationale:      increase ROM, increase strength, improve coordination and improve balance to improve the patients ability to perform functional mobility activities with decrease c/o symptoms.      7 (NC) min Manual Therapy: Prone 1 pillow: STM L/S paraspinals and upper glute region. Rationale:      decrease pain, increase ROM and increase tissue extensibility to improve patient's ability to perform functional mobility activities with decrease c/o symptoms.     Billed With/As:   [x] TE   [] TA   [] Neuro   [] Self Care Patient Education: [x] Review HEP    [] Progressed/Changed HEP based on:   [] positioning   [] body mechanics   [] transfers   [] heat/ice application    [] other:      Other Objective/Functional Measures:    Patient continues to demonstrate improved quality of body mechanics with sit <> supine and sit <> stand. Post Treatment Pain Level (on 0 to 10) scale:   5  / 10     ASSESSMENT  Assessment/Changes in Function:     Add SB1 10 x 5 seconds for core strengthening. Increase SKTC to15 seconds and SB flexion to 15 seconds. Slight increase of pain primarily from manual, no radicular symptoms     []  See Progress Note/Recertification   Patient will continue to benefit from skilled PT services to modify and progress therapeutic interventions, address functional mobility deficits, address ROM deficits, address strength deficits, analyze and address soft tissue restrictions and analyze and cue movement patterns to attain remaining goals. Progress toward goals / Updated goals:    1. Pt to demonstrate l/s AROM WFL to improve functional mobility for ADLs   2.   Pt will report 75% overall improvement in functional ADL's in order to return to PLOF.    3.  Patient will be independent with long term HEP in order to prepare for DC to home:          PLAN  []  Upgrade activities as tolerated yes Continue plan of care   []  Discharge due to :    []  Other:      Therapist: Darrin Mays PTA    Date: 9/9/2020 Time: 6:09 AM     Future Appointments   Date Time Provider Jenni Galvan   9/9/2020 11:30 AM Sheeba Reed [facility]   9/11/2020 11:00 AM Marisol Lyles [facility]   9/15/2020 10:45 AM Jose Harrison PTA [facility]   9/18/2020 11:00 AM NOBLE Linares [id number]

## 2020-09-18 ENCOUNTER — APPOINTMENT (OUTPATIENT)
Dept: PHYSICAL THERAPY | Age: 65
End: 2020-09-18
Payer: MEDICAID

## 2020-09-29 NOTE — PROGRESS NOTES
[address] [place] [address] PHYSICAL THERAPY   [facility], [facility] [address],[facility], [facility] - Phone: [phone]  Fax: [phone]    DISCHARGE SUMMARY  Patient Name: Rachid Taveras : 1955   Treatment/Medical Diagnosis: Low back pain [M54.5]  Lower extremity pain [M79.606]     Referral Source: Brisa Simpson MD     Date of Initial Visit: 2020 Attended Visits: 8 Missed Visits: 7     SUMMARY OF TREATMENT  Rachid Taveras has been seen at our clinic 2-3x/wk for a total of 8 visits. Pt treatment has consisted of  therapeutic exercise for lumbar ROM, hip/core strengthening, modalities prn and manual therapy (Prone 1 pillow: STM L/S paraspinals and upper glute region.)    CURRENT STATUS  Pt has had a good tolerance to physical therapy treatment. Patient's last appointment for PT was on 2020. Patient had not returned after that date and was called to let her know she had surpassed our NS/CXL policy and would be discharged at this time. We attempted to work with patient secondary to transportation issues. No formal reassessment toward goals was able to be performed. Patient has been discharged at this time. Goal/Measure of Progress Goal Met? 1.  Pt to demonstrate l/s AROM WFL to improve functional mobility for ADLs   Status at last Eval: Patient continues to demonstrate guarded motion with extension and flexion Current Status: Unable to reasess no   2. Pt will report 75% overall improvement in functional ADL's in order to return to PLOF.    Status at last Eval: Patient reports ~50% overall improvement with all general ADLs and functional mobility activities Current Status: Unable to reassess no   3. Patient will be independent with long term HEP in order to prepare for DC to home:   Status at last Eval: no Current Status: no no       RECOMMENDATIONS  Discharge from physical therapy treatment with HEP.   Specifics: Patient has ns/cxl of 7 appointments    If you have any questions/comments please contact us directly at [phone]. Thank you for allowing us to assist in the care of your patient. LPTA Signature: Susan Li PTA Date: 9/29/2020   PT Signature: Marge Urbano PT Time: 4:15 PM       NOTE TO PHYSICIAN:  Your patient's insurance requires this discharge note be signed and returned. PLEASE COMPLETE THE ORDERS BELOW AND RETURN TO:  [facility]    ___ I have read the above report and request that my patient be discharged from therapy.      Physician Signature:        Date:       Time:

## 2020-10-01 ENCOUNTER — APPOINTMENT (OUTPATIENT)
Dept: PHYSICAL THERAPY | Age: 65
End: 2020-10-01
